# Patient Record
Sex: FEMALE | Race: BLACK OR AFRICAN AMERICAN | NOT HISPANIC OR LATINO | Employment: FULL TIME | URBAN - METROPOLITAN AREA
[De-identification: names, ages, dates, MRNs, and addresses within clinical notes are randomized per-mention and may not be internally consistent; named-entity substitution may affect disease eponyms.]

---

## 2017-07-14 ENCOUNTER — OFFICE VISIT (OUTPATIENT)
Dept: URGENT CARE | Age: 57
End: 2017-07-14
Payer: COMMERCIAL

## 2017-07-14 PROCEDURE — G0382 LEV 3 HOSP TYPE B ED VISIT: HCPCS | Performed by: FAMILY MEDICINE

## 2022-05-10 ENCOUNTER — OFFICE VISIT (OUTPATIENT)
Dept: INTERNAL MEDICINE CLINIC | Facility: CLINIC | Age: 62
End: 2022-05-10
Payer: COMMERCIAL

## 2022-05-10 VITALS
DIASTOLIC BLOOD PRESSURE: 80 MMHG | HEIGHT: 63 IN | BODY MASS INDEX: 26.05 KG/M2 | OXYGEN SATURATION: 98 % | HEART RATE: 85 BPM | TEMPERATURE: 97.6 F | SYSTOLIC BLOOD PRESSURE: 132 MMHG | WEIGHT: 147 LBS

## 2022-05-10 DIAGNOSIS — I73.9 PAD (PERIPHERAL ARTERY DISEASE) (HCC): ICD-10-CM

## 2022-05-10 DIAGNOSIS — Z12.31 ENCOUNTER FOR SCREENING MAMMOGRAM FOR MALIGNANT NEOPLASM OF BREAST: ICD-10-CM

## 2022-05-10 DIAGNOSIS — Z12.11 SCREENING FOR COLON CANCER: ICD-10-CM

## 2022-05-10 DIAGNOSIS — R73.01 IMPAIRED FASTING BLOOD SUGAR: ICD-10-CM

## 2022-05-10 DIAGNOSIS — Z00.00 MEDICARE ANNUAL WELLNESS VISIT, INITIAL: ICD-10-CM

## 2022-05-10 DIAGNOSIS — E78.2 MIXED HYPERLIPIDEMIA: ICD-10-CM

## 2022-05-10 DIAGNOSIS — I10 ESSENTIAL HYPERTENSION, BENIGN: Primary | ICD-10-CM

## 2022-05-10 DIAGNOSIS — Z12.11 ENCOUNTER FOR SCREENING FOR MALIGNANT NEOPLASM OF COLON: ICD-10-CM

## 2022-05-10 DIAGNOSIS — E87.6 HYPOKALEMIA: ICD-10-CM

## 2022-05-10 DIAGNOSIS — E03.9 ACQUIRED HYPOTHYROIDISM: ICD-10-CM

## 2022-05-10 PROCEDURE — G0438 PPPS, INITIAL VISIT: HCPCS | Performed by: INTERNAL MEDICINE

## 2022-05-10 PROCEDURE — 99203 OFFICE O/P NEW LOW 30 MIN: CPT | Performed by: INTERNAL MEDICINE

## 2022-05-10 RX ORDER — LISINOPRIL 5 MG/1
2.5 TABLET ORAL
COMMUNITY
End: 2022-05-10

## 2022-05-10 RX ORDER — LISINOPRIL 2.5 MG/1
2.5 TABLET ORAL DAILY
Qty: 90 TABLET | Refills: 1 | Status: SHIPPED | OUTPATIENT
Start: 2022-05-10 | End: 2022-07-19 | Stop reason: SDUPTHER

## 2022-05-10 RX ORDER — LEVOTHYROXINE SODIUM 112 UG/1
112 TABLET ORAL DAILY
Qty: 90 TABLET | Refills: 1 | Status: SHIPPED | OUTPATIENT
Start: 2022-05-10 | End: 2022-07-19 | Stop reason: SDUPTHER

## 2022-05-10 RX ORDER — METOPROLOL SUCCINATE 25 MG/1
25 TABLET, EXTENDED RELEASE ORAL DAILY
Qty: 90 TABLET | Refills: 1 | Status: SHIPPED | OUTPATIENT
Start: 2022-05-10 | End: 2022-07-19 | Stop reason: SDUPTHER

## 2022-05-10 RX ORDER — POTASSIUM CHLORIDE 750 MG/1
10 TABLET, FILM COATED, EXTENDED RELEASE ORAL DAILY
Qty: 90 TABLET | Refills: 1 | Status: SHIPPED | OUTPATIENT
Start: 2022-05-10

## 2022-05-10 RX ORDER — ASPIRIN 81 MG/1
81 TABLET ORAL DAILY
COMMUNITY

## 2022-05-10 RX ORDER — AMLODIPINE BESYLATE 2.5 MG/1
2.5 TABLET ORAL DAILY
COMMUNITY
End: 2022-05-10 | Stop reason: SDUPTHER

## 2022-05-10 RX ORDER — LEVOTHYROXINE SODIUM 0.1 MG/1
TABLET ORAL
COMMUNITY
End: 2022-05-10

## 2022-05-10 RX ORDER — ATORVASTATIN CALCIUM 10 MG/1
10 TABLET, FILM COATED ORAL DAILY
Qty: 90 TABLET | Refills: 1 | Status: SHIPPED | OUTPATIENT
Start: 2022-05-10 | End: 2022-07-19 | Stop reason: SDUPTHER

## 2022-05-10 RX ORDER — HYDROCHLOROTHIAZIDE 12.5 MG/1
12.5 TABLET ORAL DAILY
COMMUNITY
End: 2022-05-10 | Stop reason: SDUPTHER

## 2022-05-10 RX ORDER — LEVOTHYROXINE SODIUM 112 UG/1
112 TABLET ORAL DAILY
COMMUNITY
End: 2022-05-10 | Stop reason: SDUPTHER

## 2022-05-10 RX ORDER — AMLODIPINE BESYLATE 2.5 MG/1
2.5 TABLET ORAL DAILY
Qty: 90 TABLET | Refills: 1 | Status: SHIPPED | OUTPATIENT
Start: 2022-05-10 | End: 2022-07-19 | Stop reason: SDUPTHER

## 2022-05-10 RX ORDER — ATORVASTATIN CALCIUM 10 MG/1
10 TABLET, FILM COATED ORAL DAILY
COMMUNITY
End: 2022-05-10 | Stop reason: SDUPTHER

## 2022-05-10 RX ORDER — HYDROCHLOROTHIAZIDE 12.5 MG/1
12.5 TABLET ORAL DAILY
Qty: 90 TABLET | Refills: 1 | Status: SHIPPED | OUTPATIENT
Start: 2022-05-10 | End: 2022-07-19 | Stop reason: SDUPTHER

## 2022-05-10 RX ORDER — POTASSIUM CHLORIDE 750 MG/1
10 TABLET, FILM COATED, EXTENDED RELEASE ORAL DAILY
COMMUNITY
End: 2022-05-10 | Stop reason: SDUPTHER

## 2022-05-10 RX ORDER — METOPROLOL SUCCINATE 25 MG/1
25 TABLET, EXTENDED RELEASE ORAL DAILY
COMMUNITY
End: 2022-05-10 | Stop reason: SDUPTHER

## 2022-05-10 RX ORDER — LISINOPRIL 2.5 MG/1
2.5 TABLET ORAL DAILY
COMMUNITY
End: 2022-05-10 | Stop reason: SDUPTHER

## 2022-05-10 RX ORDER — AMLODIPINE BESYLATE 5 MG/1
2.5 TABLET ORAL
COMMUNITY
End: 2022-05-10

## 2022-05-10 NOTE — PROGRESS NOTES
Assessment and Plan:     Problem List Items Addressed This Visit        Endocrine    Acquired hypothyroidism    Relevant Medications    levothyroxine 112 mcg tablet    metoprolol succinate (TOPROL-XL) 25 mg 24 hr tablet    Other Relevant Orders    TSH, 3rd generation    Impaired fasting blood sugar       Cardiovascular and Mediastinum    PAD (peripheral artery disease) (HCC)    Essential hypertension, benign - Primary    Relevant Medications    amLODIPine (NORVASC) 2 5 mg tablet    hydrochlorothiazide (HYDRODIURIL) 12 5 mg tablet    lisinopril (ZESTRIL) 2 5 mg tablet    metoprolol succinate (TOPROL-XL) 25 mg 24 hr tablet    Other Relevant Orders    CBC and differential       Other    Mixed hyperlipidemia    Relevant Medications    atorvastatin (Lipitor) 10 mg tablet    Other Relevant Orders    Lipid Panel with Direct LDL reflex    Hypokalemia    Relevant Medications    potassium chloride (Klor-Con) 10 mEq tablet    Medicare annual wellness visit, initial      Other Visit Diagnoses     Encounter for screening for malignant neoplasm of colon        Relevant Orders    Cologuard    Encounter for screening mammogram for malignant neoplasm of breast        Relevant Orders    Mammo screening bilateral w cad        BMI Counseling: Body mass index is 26 04 kg/m²  The BMI is above normal  Nutrition recommendations include decreasing portion sizes, encouraging healthy choices of fruits and vegetables and decreasing fast food intake  Exercise recommendations include moderate physical activity 150 minutes/week  Rationale for BMI follow-up plan is due to patient being overweight or obese  Depression Screening and Follow-up Plan: Patient was screened for depression during today's encounter  They screened negative with a PHQ-2 score of 1  Tobacco Cessation Counseling: Tobacco cessation counseling was provided   The patient is sincerely urged to quit consumption of tobacco  She is ready to quit tobacco      Preventive health issues were discussed with patient, and age appropriate screening tests were ordered as noted in patient's After Visit Summary  Personalized health advice and appropriate referrals for health education or preventive services given if needed, as noted in patient's After Visit Summary       History of Present Illness:     Patient presents for Medicare Annual Wellness visit    Patient Care Team:  Ivis Spring MD as PCP - [address] [place], [place] [id number] [facility]     Problem List:     Patient Active Problem List   Diagnosis    [facility]per[facility]) [facility] )    Acquired hypothyroidism    Mixed hyperlipidemia    Essential hypertension, benign    Hypokalemia    Impaired fasting blood sugar    Medicare annual wellness visit, initial      Past Medical and Surgical History:     Past Medical History:   Diagnosis Date    Heart disease     High cholesterol     Irregular heart beat     [facility]peripheral [facility] disease) [facility]     Primary hypertension      Past Surgical History:   Procedure Laterality Date    LAPAROTOMY      FOR FALLOPIAN TUBE      Family History:     Family History   Problem Relation Age of Onset    Heart disease Mother    Eddye Nay' disease Mother     Stroke Mother     Lung cancer Mother     Stroke Father     Heart disease Father       Social History:     Social History     Socioeconomic History    Marital status: /Civil Union     Spouse name: None    Number of children: None    Years of education: None    Highest education level: None   Occupational History    None   Tobacco Use    Smoking status: Current Every Day Smoker     Packs/day: 0 50    Smokeless tobacco: Never Used   Substance and Sexual Activity    Alcohol use: Not Currently    Drug use: None    Sexual activity: None   Other Topics Concern    None   Social History Narrative    None     Social Determinants of Health     Financial Resource Strain: Not on file   Food Insecurity: Not on file   Transportation Needs: Not on file Physical Activity: Not on file   Stress: Not on file   Social Connections: Not on file   Intimate Partner Violence: Not on file   Housing Stability: Not on file      Medications and Allergies:     Current Outpatient Medications   Medication Sig Dispense Refill    amLODIPine (NORVASC) 2 5 mg tablet Take 1 tablet (2 5 mg total) by mouth daily 90 tablet 1    aspirin (ECOTRIN LOW STRENGTH) 81 mg EC tablet Take 81 mg by mouth daily      atorvastatin (Lipitor) 10 mg tablet Take 1 tablet (10 mg total) by mouth daily 90 tablet 1    hydrochlorothiazide (HYDRODIURIL) 12 5 mg tablet Take 1 tablet (12 5 mg total) by mouth daily 90 tablet 1    levothyroxine 112 mcg tablet Take 1 tablet (112 mcg total) by mouth daily 90 tablet 1    lisinopril (ZESTRIL) 2 5 mg tablet Take 1 tablet (2 5 mg total) by mouth daily 90 tablet 1    metoprolol succinate (TOPROL-XL) 25 mg 24 hr tablet Take 1 tablet (25 mg total) by mouth daily 90 tablet 1    potassium chloride (Klor-Con) 10 mEq tablet Take 1 tablet (10 mEq total) by mouth in the morning 90 tablet 1     No current facility-administered medications for this visit  Allergies   Allergen Reactions    Penicillins Hives and Itching      Immunizations: There is no immunization history on file for this patient     Health Maintenance:         Topic Date Due    Hepatitis C Screening  Never done    HIV Screening  Never done    Cervical Cancer Screening  Never done    Breast Cancer Screening: Mammogram  Never done    Colorectal Cancer Screening  Never done         Topic Date Due    COVID-19 Vaccine (1) Never done    Pneumococcal Vaccine: Pediatrics (0 to 5 Years) and At-Risk Patients (6 to 59 Years) (1 of 2 - PPSV23) Never done    DTaP,Tdap,and Td Vaccines (1 - Tdap) Never done      Medicare Health Risk Assessment:     /80 (BP Location: Right arm, Patient Position: Sitting, Cuff Size: Standard)   Pulse 85   Temp 97 6 °F (36 4 °C) (Temporal)   Ht 5' 3" (1 6 m)   Wt 66 7 kg (147 lb)   SpO2 98%   BMI 26 04 kg/m²          Health Risk Assessment:   Patient rates overall health as fair  Patient feels that their physical health rating is same  Patient is very satisfied with their life  Eyesight was rated as same  Hearing was rated as same  Patient feels that their emotional and mental health rating is same  Patients states they are never, rarely angry  Patient states they are always unusually tired/fatigued  Pain experienced in the last 7 days has been some  Patient's pain rating has been 5/10  Patient states that she has experienced no weight loss or gain in last 6 months  Depression Screening:   PHQ-2 Score: 1  PHQ-9 Score: 1      Fall Risk Screening: In the past year, patient has experienced: no history of falling in past year      Urinary Incontinence Screening:   Patient has not leaked urine accidently in the last six months  Home Safety:  Patient has trouble with stairs inside or outside of their home  Patient has working smoke alarms and has working carbon monoxide detector  Home safety hazards include: none  Nutrition:   Current diet is Regular  Medications:   Patient is currently taking over-the-counter supplements  OTC medications include: see medication list  Patient is able to manage medications  Activities of Daily Living (ADLs)/Instrumental Activities of Daily Living (IADLs):   Walk and transfer into and out of bed and chair?: Yes  Dress and groom yourself?: Yes    Bathe or shower yourself?: Yes    Feed yourself?  Yes  Do your laundry/housekeeping?: Yes  Manage your money, pay your bills and track your expenses?: Yes  Make your own meals?: Yes    Do your own shopping?: Yes    Previous Hospitalizations:   Any hospitalizations or ED visits within the last 12 months?: No      Advance Care Planning:   Living will: No    Durable POA for healthcare: No    Advanced directive: No    Five wishes given: Yes      PREVENTIVE SCREENINGS      Cardiovascular Screening:    General: Screening Not Indicated and History Lipid Disorder      Lung Cancer Screening:     General: Screening Not Indicated    Screening, Brief Intervention, and Referral to Treatment (SBIRT)    Screening  Typical number of drinks in a day: 0  Typical number of drinks in a week: 0  Interpretation: Low risk drinking behavior      Single Item Drug Screening:  How often have you used an illegal drug (including marijuana) or a prescription medication for non-medical reasons in the past year? never    Single Item Drug Screen Score: 0  Interpretation: Negative screen for possible drug use disorder      Malcolm Villanueva MD

## 2022-05-10 NOTE — PROGRESS NOTES
Assessment/Plan:      Depression Screening and Follow-up Plan: Patient was screened for depression during today's encounter  They screened negative with a PHQ-2 score of 1  Tobacco Cessation Counseling: Tobacco cessation counseling was provided  The patient is sincerely urged to quit consumption of tobacco  She is ready to quit tobacco            1  Essential hypertension, benign  -     CBC and differential; Future  -     amLODIPine (NORVASC) 2 5 mg tablet; Take 1 tablet (2 5 mg total) by mouth daily  -     hydrochlorothiazide (HYDRODIURIL) 12 5 mg tablet; Take 1 tablet (12 5 mg total) by mouth daily  -     lisinopril (ZESTRIL) 2 5 mg tablet; Take 1 tablet (2 5 mg total) by mouth daily  -     metoprolol succinate (TOPROL-XL) 25 mg 24 hr tablet; Take 1 tablet (25 mg total) by mouth daily    2  Medicare annual wellness visit, initial    3  Mixed hyperlipidemia  -     Lipid Panel with Direct LDL reflex; Future  -     atorvastatin (Lipitor) 10 mg tablet; Take 1 tablet (10 mg total) by mouth daily    4  PAD (peripheral artery disease) ([facility] )    5  Acquired hypothyroidism  -     TSH, 3rd generation; Future  -     levothyroxine 112 mcg tablet; Take 1 tablet (112 mcg total) by mouth daily    6  Hypokalemia  -     potassium chloride (Klor-Con) 10 mEq tablet; Take 1 tablet (10 mEq total) by mouth in the morning    7  Impaired fasting blood sugar    8  Encounter for screening for malignant neoplasm of colon  -     Cologuard    9  Encounter for screening mammogram for malignant neoplasm of breast  -     Mammo screening bilateral w cad; Future; Expected date: 05/10/2022           Subjective:      Patient ID: Sylvia Small is a 65 y o  female      NEW PATIENT, CHECKUP, NEEDS MEDICATION, ALSO MEDICARE WELLNESS EXAM      The following portions of the patient's history were reviewed and updated as appropriate: She  has a past medical history of Heart disease, High cholesterol, Irregular heart beat, PAD (peripheral artery disease) ([facility] ), and Primary hypertension  She   Patient Active Problem List    Diagnosis Date Noted    PAD (peripheral artery disease) ([facility] ) 05/10/2022    Acquired hypothyroidism 05/10/2022    Mixed hyperlipidemia 05/10/2022    Essential hypertension, benign 05/10/2022    Hypokalemia 05/10/2022    Impaired fasting blood sugar 05/10/2022    Medicare annual wellness visit, initial 05/10/2022     She  has a past surgical history that includes LAPAROTOMY  Her family history includes Graves' disease in her mother; Heart disease in her father and mother; Lung cancer in her mother; Stroke in her father and mother  She  reports that she has been smoking  She has been smoking about 0 50 packs per day  She has never used smokeless tobacco  She reports previous alcohol use  No history on file for drug use  Current Outpatient Medications   Medication Sig Dispense Refill    amLODIPine (NORVASC) 2 5 mg tablet Take 1 tablet (2 5 mg total) by mouth daily 90 tablet 1    aspirin (ECOTRIN LOW STRENGTH) 81 mg EC tablet Take 81 mg by mouth daily      atorvastatin (Lipitor) 10 mg tablet Take 1 tablet (10 mg total) by mouth daily 90 tablet 1    hydrochlorothiazide (HYDRODIURIL) 12 5 mg tablet Take 1 tablet (12 5 mg total) by mouth daily 90 tablet 1    levothyroxine 112 mcg tablet Take 1 tablet (112 mcg total) by mouth daily 90 tablet 1    lisinopril (ZESTRIL) 2 5 mg tablet Take 1 tablet (2 5 mg total) by mouth daily 90 tablet 1    metoprolol succinate (TOPROL-XL) 25 mg 24 hr tablet Take 1 tablet (25 mg total) by mouth daily 90 tablet 1    potassium chloride (Klor-Con) 10 mEq tablet Take 1 tablet (10 mEq total) by mouth in the morning 90 tablet 1     No current facility-administered medications for this visit       Current Outpatient Medications on File Prior to Visit   Medication Sig    aspirin (ECOTRIN LOW STRENGTH) 81 mg EC tablet Take 81 mg by mouth daily    [DISCONTINUED] amLODIPine (NORVASC) 2 5 mg tablet Take 2 5 mg by mouth daily    [DISCONTINUED] amLODIPine (Norvasc) 5 mg tablet Take 2 5 mg by mouth      [DISCONTINUED] atorvastatin (Lipitor) 10 mg tablet Take 10 mg by mouth daily      [DISCONTINUED] hydrochlorothiazide (HYDRODIURIL) 12 5 mg tablet Take 12 5 mg by mouth daily    [DISCONTINUED] levothyroxine 112 mcg tablet Take 112 mcg by mouth daily      [DISCONTINUED] lisinopril (ZESTRIL) 2 5 mg tablet Take 2 5 mg by mouth daily    [DISCONTINUED] lisinopril (ZESTRIL) 5 mg tablet 2 5 mg      [DISCONTINUED] LOW-DOSE ASPIRIN PO     [DISCONTINUED] metoprolol succinate (TOPROL-XL) 25 mg 24 hr tablet Take 25 mg by mouth daily      [DISCONTINUED] potassium chloride (Klor-Con) 10 mEq tablet Take 10 mEq by mouth in the morning      [DISCONTINUED] levothyroxine (Synthroid) 100 mcg tablet Take by mouth     No current facility-administered medications on file prior to visit  She is allergic to penicillins       Review of Systems   Constitutional: Negative for chills and fever  HENT: Negative for congestion, ear pain and sore throat  Eyes: Negative for pain  Respiratory: Negative for cough and shortness of breath  Cardiovascular: Negative for chest pain and leg swelling  Gastrointestinal: Negative for abdominal pain, nausea and vomiting  Endocrine: Negative for polyuria  Genitourinary: Negative for difficulty urinating, frequency and urgency  Musculoskeletal: Negative for arthralgias and back pain  Skin: Negative for rash  Neurological: Negative for weakness and headaches  Psychiatric/Behavioral: Negative for sleep disturbance  The patient is not nervous/anxious  Objective:      /80 (BP Location: Right arm, Patient Position: Sitting, Cuff Size: Standard)   Pulse 85   Temp 97 6 °F (36 4 °C) (Temporal)   Ht 5' 3" (1 6 m)   Wt 66 7 kg (147 lb)   SpO2 98%   BMI 26 04 kg/m²     No results found for this or any previous visit (from the past 1344 hour(s))       Physical Exam  Constitutional: Appearance: Normal appearance  HENT:      Head: Normocephalic  Right Ear: Tympanic membrane, ear canal and external ear normal       Left Ear: Tympanic membrane, ear canal and external ear normal       Nose: Nose normal  No congestion  Mouth/Throat:      Mouth: Mucous membranes are moist       Pharynx: Oropharynx is clear  No oropharyngeal exudate or posterior oropharyngeal erythema  Eyes:      Extraocular Movements: Extraocular movements intact  Conjunctiva/sclera: Conjunctivae normal       Pupils: Pupils are equal, round, and reactive to light  Cardiovascular:      Rate and Rhythm: Normal rate and regular rhythm  Heart sounds: Normal heart sounds  No murmur heard  Pulmonary:      Effort: Pulmonary effort is normal       Breath sounds: Normal breath sounds  No wheezing or rales  Abdominal:      General: Bowel sounds are normal  There is no distension  Palpations: Abdomen is soft  Tenderness: There is no abdominal tenderness  Musculoskeletal:         General: Normal range of motion  Cervical back: Normal range of motion and neck supple  Right lower leg: No edema  Left lower leg: No edema  Lymphadenopathy:      Cervical: No cervical adenopathy  Skin:     General: Skin is warm  Neurological:      General: No focal deficit present  Mental Status: She is alert and oriented to person, place, and time

## 2022-06-09 DIAGNOSIS — R73.01 IMPAIRED FASTING BLOOD SUGAR: Primary | ICD-10-CM

## 2022-07-19 ENCOUNTER — OFFICE VISIT (OUTPATIENT)
Dept: INTERNAL MEDICINE CLINIC | Facility: CLINIC | Age: 62
End: 2022-07-19
Payer: COMMERCIAL

## 2022-07-19 VITALS
HEIGHT: 63 IN | HEART RATE: 75 BPM | DIASTOLIC BLOOD PRESSURE: 78 MMHG | SYSTOLIC BLOOD PRESSURE: 122 MMHG | WEIGHT: 146.2 LBS | OXYGEN SATURATION: 98 % | BODY MASS INDEX: 25.91 KG/M2 | TEMPERATURE: 97.8 F

## 2022-07-19 DIAGNOSIS — R73.01 IMPAIRED FASTING BLOOD SUGAR: ICD-10-CM

## 2022-07-19 DIAGNOSIS — E03.9 ACQUIRED HYPOTHYROIDISM: ICD-10-CM

## 2022-07-19 DIAGNOSIS — E78.2 MIXED HYPERLIPIDEMIA: ICD-10-CM

## 2022-07-19 DIAGNOSIS — I73.9 PAD (PERIPHERAL ARTERY DISEASE) (HCC): ICD-10-CM

## 2022-07-19 DIAGNOSIS — I10 ESSENTIAL HYPERTENSION, BENIGN: Primary | ICD-10-CM

## 2022-07-19 LAB
BASOPHILS # BLD AUTO: 59 CELLS/UL (ref 0–200)
BASOPHILS NFR BLD AUTO: 1.1 %
CHOLEST SERPL-MCNC: 165 MG/DL
CHOLEST/HDLC SERPL: 3.2 (CALC)
EOSINOPHIL # BLD AUTO: 151 CELLS/UL (ref 15–500)
EOSINOPHIL NFR BLD AUTO: 2.8 %
ERYTHROCYTE [DISTWIDTH] IN BLOOD BY AUTOMATED COUNT: 12.5 % (ref 11–15)
HBA1C MFR BLD: 5.9 % OF TOTAL HGB
HCT VFR BLD AUTO: 40 % (ref 35–45)
HDLC SERPL-MCNC: 51 MG/DL
HGB BLD-MCNC: 13.7 G/DL (ref 11.7–15.5)
LDLC SERPL CALC-MCNC: 97 MG/DL (CALC)
LYMPHOCYTES # BLD AUTO: 2570 CELLS/UL (ref 850–3900)
LYMPHOCYTES NFR BLD AUTO: 47.6 %
MCH RBC QN AUTO: 31.6 PG (ref 27–33)
MCHC RBC AUTO-ENTMCNC: 34.3 G/DL (ref 32–36)
MCV RBC AUTO: 92.2 FL (ref 80–100)
MONOCYTES # BLD AUTO: 486 CELLS/UL (ref 200–950)
MONOCYTES NFR BLD AUTO: 9 %
NEUTROPHILS # BLD AUTO: 2133 CELLS/UL (ref 1500–7800)
NEUTROPHILS NFR BLD AUTO: 39.5 %
NONHDLC SERPL-MCNC: 114 MG/DL (CALC)
PLATELET # BLD AUTO: 330 THOUSAND/UL (ref 140–400)
PMV BLD REES-ECKER: 10.5 FL (ref 7.5–12.5)
RBC # BLD AUTO: 4.34 MILLION/UL (ref 3.8–5.1)
TRIGL SERPL-MCNC: 81 MG/DL
TSH SERPL-ACNC: 3.81 MIU/L (ref 0.4–4.5)
WBC # BLD AUTO: 5.4 THOUSAND/UL (ref 3.8–10.8)

## 2022-07-19 PROCEDURE — 99214 OFFICE O/P EST MOD 30 MIN: CPT | Performed by: INTERNAL MEDICINE

## 2022-07-19 RX ORDER — LEVOTHYROXINE SODIUM 112 UG/1
112 TABLET ORAL DAILY
Qty: 90 TABLET | Refills: 1 | Status: SHIPPED | OUTPATIENT
Start: 2022-07-19

## 2022-07-19 RX ORDER — METOPROLOL SUCCINATE 25 MG/1
25 TABLET, EXTENDED RELEASE ORAL DAILY
Qty: 90 TABLET | Refills: 1 | Status: SHIPPED | OUTPATIENT
Start: 2022-07-19

## 2022-07-19 RX ORDER — HYDROCHLOROTHIAZIDE 12.5 MG/1
12.5 TABLET ORAL DAILY
Qty: 90 TABLET | Refills: 1 | Status: SHIPPED | OUTPATIENT
Start: 2022-07-19

## 2022-07-19 RX ORDER — LISINOPRIL 2.5 MG/1
2.5 TABLET ORAL DAILY
Qty: 90 TABLET | Refills: 1 | Status: SHIPPED | OUTPATIENT
Start: 2022-07-19

## 2022-07-19 RX ORDER — AMLODIPINE BESYLATE 2.5 MG/1
2.5 TABLET ORAL DAILY
Qty: 90 TABLET | Refills: 1 | Status: SHIPPED | OUTPATIENT
Start: 2022-07-19

## 2022-07-19 RX ORDER — ATORVASTATIN CALCIUM 10 MG/1
10 TABLET, FILM COATED ORAL DAILY
Qty: 90 TABLET | Refills: 1 | Status: SHIPPED | OUTPATIENT
Start: 2022-07-19

## 2022-07-19 NOTE — PROGRESS NOTES
Assessment/Plan:      Depression Screening and Follow-up Plan: Patient was screened for depression during today's encounter  They screened negative with a PHQ-2 score of 2             1  Essential hypertension, benign  -     amLODIPine (NORVASC) 2 5 mg tablet; Take 1 tablet (2 5 mg total) by mouth daily  -     hydrochlorothiazide (HYDRODIURIL) 12 5 mg tablet; Take 1 tablet (12 5 mg total) by mouth daily  -     lisinopril (ZESTRIL) 2 5 mg tablet; Take 1 tablet (2 5 mg total) by mouth daily  -     metoprolol succinate (TOPROL-XL) 25 mg 24 hr tablet; Take 1 tablet (25 mg total) by mouth daily    2  Mixed hyperlipidemia  -     atorvastatin (Lipitor) 10 mg tablet; Take 1 tablet (10 mg total) by mouth daily    3  Acquired hypothyroidism  -     levothyroxine 112 mcg tablet; Take 1 tablet (112 mcg total) by mouth daily    4  Impaired fasting blood sugar    5  PAD [facility]peripheral artery disease) ([facility])         Subjective:      Patient ID: Gee Spencer is a 65 y o  female  Follow-up on blood test done yesterday, test discussed with her      The following portions of the patient's history were reviewed and updated as appropriate: She  has a past medical history of Heart disease, High cholesterol, Irregular heart beat, PAD (peripheral artery disease) ([facility] ), and Primary hypertension  She   Patient Active Problem List    Diagnosis Date Noted    [facility] 05/10/2022    Acquired hypothyroidism 05/10/2022    Mixed hyperlipidemia 05/10/2022    Essential hypertension, benign 05/10/2022    Hypokalemia 05/10/2022    Impaired fasting blood sugar 05/10/2022    Medicare annual wellness visit, initial 05/10/2022     She  has a past surgical history that includes LAPAROTOMY  Her family history includes Graves' disease in her mother; Heart disease in her father and mother; Lung cancer in her mother; Stroke in her father and mother  She  reports that she has been smoking   She has been smoking about 0 50 packs per day  She has never used smokeless tobacco  She reports previous alcohol use  No history on file for drug use  Current Outpatient Medications   Medication Sig Dispense Refill    amLODIPine (NORVASC) 2 5 mg tablet Take 1 tablet (2 5 mg total) by mouth daily 90 tablet 1    aspirin (ECOTRIN LOW STRENGTH) 81 mg EC tablet Take 81 mg by mouth daily      atorvastatin (Lipitor) 10 mg tablet Take 1 tablet (10 mg total) by mouth daily 90 tablet 1    hydrochlorothiazide (HYDRODIURIL) 12 5 mg tablet Take 1 tablet (12 5 mg total) by mouth daily 90 tablet 1    levothyroxine 112 mcg tablet Take 1 tablet (112 mcg total) by mouth daily 90 tablet 1    lisinopril (ZESTRIL) 2 5 mg tablet Take 1 tablet (2 5 mg total) by mouth daily 90 tablet 1    metoprolol succinate (TOPROL-XL) 25 mg 24 hr tablet Take 1 tablet (25 mg total) by mouth daily 90 tablet 1    potassium chloride (Klor-Con) 10 mEq tablet Take 1 tablet (10 mEq total) by mouth in the morning 90 tablet 1     No current facility-administered medications for this visit       Current Outpatient Medications on File Prior to Visit   Medication Sig    aspirin (ECOTRIN LOW STRENGTH) 81 mg EC tablet Take 81 mg by mouth daily    potassium chloride (Klor-Con) 10 mEq tablet Take 1 tablet (10 mEq total) by mouth in the morning    [DISCONTINUED] amLODIPine (NORVASC) 2 5 mg tablet Take 1 tablet (2 5 mg total) by mouth daily    [DISCONTINUED] atorvastatin (Lipitor) 10 mg tablet Take 1 tablet (10 mg total) by mouth daily    [DISCONTINUED] hydrochlorothiazide (HYDRODIURIL) 12 5 mg tablet Take 1 tablet (12 5 mg total) by mouth daily    [DISCONTINUED] levothyroxine 112 mcg tablet Take 1 tablet (112 mcg total) by mouth daily    [DISCONTINUED] lisinopril (ZESTRIL) 2 5 mg tablet Take 1 tablet (2 5 mg total) by mouth daily    [DISCONTINUED] metoprolol succinate (TOPROL-XL) 25 mg 24 hr tablet Take 1 tablet (25 mg total) by mouth daily     No current facility-administered medications on file prior to visit  She is allergic to penicillins       Review of Systems   Constitutional: Negative for chills and fever  HENT: Negative for congestion, ear pain and sore throat  Eyes: Negative for pain  Respiratory: Negative for cough and shortness of breath  Cardiovascular: Negative for chest pain and leg swelling  Gastrointestinal: Negative for abdominal pain, nausea and vomiting  Endocrine: Negative for polyuria  Genitourinary: Negative for difficulty urinating, frequency and urgency  Musculoskeletal: Negative for arthralgias and back pain  Skin: Negative for rash  Neurological: Negative for weakness and headaches  Psychiatric/Behavioral: Negative for sleep disturbance  The patient is not nervous/anxious            Objective:      /78 (BP Location: Left arm, Patient Position: Sitting, Cuff Size: Adult)   Pulse 75   Temp 97 8 °F (36 6 °C) (Temporal)   Ht 5' 3" (1 6 m)   Wt 66 3 kg (146 lb 3 2 oz)   SpO2 98%   BMI 25 90 kg/m²     Recent Results (from the past 1344 hour(s))   Lipid Panel with Direct LDL reflex    Collection Time: 07/18/22 11:00 AM   Result Value Ref Range    Total Cholesterol 165 <200 mg/dL    HDL 51 > OR = 50 mg/dL    Triglycerides 81 <150 mg/dL    LDL Calculated 97 mg/dL (calc)    Chol HDLC Ratio 3 2 <5 0 (calc)    Non-HDL Cholesterol 114 <130 mg/dL (calc)   CBC and differential    Collection Time: 07/18/22 11:00 AM   Result Value Ref Range    White Blood Cell Count 5 4 3 8 - 10 8 Thousand/uL    Red Blood Cell Count 4 34 3 80 - 5 10 Million/uL    Hemoglobin 13 7 11 7 - 15 5 g/dL    HCT 40 0 35 0 - 45 0 %    MCV 92 2 80 0 - 100 0 fL    MCH 31 6 27 0 - 33 0 pg    MCHC 34 3 32 0 - 36 0 g/dL    RDW 12 5 11 0 - 15 0 %    Platelet Count 140 539 - 400 Thousand/uL    SL AMB MPV 10 5 7 5 - 12 5 fL    Neutrophils (Absolute) 2,133 1,500 - 7,800 cells/uL    Lymphocytes (Absolute) 2,570 850 - 3,900 cells/uL    Monocytes (Absolute) 486 200 - 950 cells/uL Eosinophils (Absolute) 151 15 - 500 cells/uL    Basophils ABS 59 0 - 200 cells/uL    Neutrophils 39 5 %    Lymphocytes 47 6 %    Monocytes 9 0 %    Eosinophils 2 8 %    Basophils PCT 1 1 %   TSH, 3rd generation    Collection Time: 07/18/22 11:00 AM   Result Value Ref Range    TSH 3 81 0 40 - 4 50 mIU/L   Hemoglobin A1c (w/out EAG)    Collection Time: 07/18/22 11:00 AM   Result Value Ref Range    Hemoglobin A1C 5 9 (H) <5 7 % of total Hgb   HEMOGLOBIN A1C    Collection Time: 07/18/22 11:00 AM   Result Value Ref Range    Hgb A1c 5 9 (H) <5 7 % of total Hgb        Physical Exam  Constitutional:       Appearance: Normal appearance  HENT:      Head: Normocephalic  Right Ear: Tympanic membrane, ear canal and external ear normal       Left Ear: Tympanic membrane, ear canal and external ear normal       Nose: Nose normal  No congestion  Mouth/Throat:      Mouth: Mucous membranes are moist       Pharynx: Oropharynx is clear  No oropharyngeal exudate or posterior oropharyngeal erythema  Eyes:      Extraocular Movements: Extraocular movements intact  Conjunctiva/sclera: Conjunctivae normal       Pupils: Pupils are equal, round, and reactive to light  Cardiovascular:      Rate and Rhythm: Normal rate and regular rhythm  Heart sounds: Normal heart sounds  No murmur heard  Pulmonary:      Effort: Pulmonary effort is normal       Breath sounds: Normal breath sounds  No wheezing or rales  Abdominal:      General: Bowel sounds are normal  There is no distension  Palpations: Abdomen is soft  Tenderness: There is no abdominal tenderness  Musculoskeletal:         General: Normal range of motion  Cervical back: Normal range of motion and neck supple  Right lower leg: No edema  Left lower leg: No edema  Lymphadenopathy:      Cervical: No cervical adenopathy  Skin:     General: Skin is warm  Neurological:      General: No focal deficit present        Mental Status: She is alert and oriented to person, place, and time

## 2022-10-11 PROBLEM — Z00.00 MEDICARE ANNUAL WELLNESS VISIT, INITIAL: Status: RESOLVED | Noted: 2022-05-10 | Resolved: 2022-10-11

## 2022-12-30 LAB — HCV AB SER-ACNC: NORMAL

## 2023-01-11 ENCOUNTER — RA CDI HCC (OUTPATIENT)
Dept: OTHER | Facility: HOSPITAL | Age: 63
End: 2023-01-11

## 2023-01-11 NOTE — PROGRESS NOTES
[facility]  coding opportunities       Chart reviewed, no opportunity found:   [address]        Patients Insurance     Medicare Insurance: [employer] Advantage

## 2023-01-13 LAB
BASOPHILS # BLD AUTO: 47 CELLS/UL (ref 0–200)
BASOPHILS NFR BLD AUTO: 0.8 %
CHOLEST SERPL-MCNC: 153 MG/DL
CHOLEST/HDLC SERPL: 3 (CALC)
EOSINOPHIL # BLD AUTO: 83 CELLS/UL (ref 15–500)
EOSINOPHIL NFR BLD AUTO: 1.4 %
ERYTHROCYTE [DISTWIDTH] IN BLOOD BY AUTOMATED COUNT: 12.6 % (ref 11–15)
HBA1C MFR BLD: 5.8 % OF TOTAL HGB
HCT VFR BLD AUTO: 38.7 % (ref 35–45)
HDLC SERPL-MCNC: 51 MG/DL
HGB BLD-MCNC: 13.4 G/DL (ref 11.7–15.5)
LDLC SERPL CALC-MCNC: 86 MG/DL (CALC)
LYMPHOCYTES # BLD AUTO: 2814 CELLS/UL (ref 850–3900)
LYMPHOCYTES NFR BLD AUTO: 47.7 %
MCH RBC QN AUTO: 31.6 PG (ref 27–33)
MCHC RBC AUTO-ENTMCNC: 34.6 G/DL (ref 32–36)
MCV RBC AUTO: 91.3 FL (ref 80–100)
MONOCYTES # BLD AUTO: 384 CELLS/UL (ref 200–950)
MONOCYTES NFR BLD AUTO: 6.5 %
NEUTROPHILS # BLD AUTO: 2572 CELLS/UL (ref 1500–7800)
NEUTROPHILS NFR BLD AUTO: 43.6 %
NONHDLC SERPL-MCNC: 102 MG/DL (CALC)
PLATELET # BLD AUTO: 296 THOUSAND/UL (ref 140–400)
PMV BLD REES-ECKER: 10.8 FL (ref 7.5–12.5)
RBC # BLD AUTO: 4.24 MILLION/UL (ref 3.8–5.1)
TRIGL SERPL-MCNC: 70 MG/DL
TSH SERPL-ACNC: 10.44 MIU/L (ref 0.4–4.5)
WBC # BLD AUTO: 5.9 THOUSAND/UL (ref 3.8–10.8)

## 2023-01-17 ENCOUNTER — OFFICE VISIT (OUTPATIENT)
Dept: INTERNAL MEDICINE CLINIC | Facility: CLINIC | Age: 63
End: 2023-01-17

## 2023-01-17 VITALS
BODY MASS INDEX: 26.93 KG/M2 | SYSTOLIC BLOOD PRESSURE: 134 MMHG | DIASTOLIC BLOOD PRESSURE: 84 MMHG | HEART RATE: 77 BPM | TEMPERATURE: 97.3 F | WEIGHT: 152 LBS | HEIGHT: 63 IN | OXYGEN SATURATION: 99 %

## 2023-01-17 DIAGNOSIS — Z12.31 ENCOUNTER FOR SCREENING MAMMOGRAM FOR MALIGNANT NEOPLASM OF BREAST: ICD-10-CM

## 2023-01-17 DIAGNOSIS — I10 ESSENTIAL HYPERTENSION, BENIGN: ICD-10-CM

## 2023-01-17 DIAGNOSIS — Z12.11 SCREENING FOR COLON CANCER: ICD-10-CM

## 2023-01-17 DIAGNOSIS — I73.9 PAD (PERIPHERAL ARTERY DISEASE) (HCC): ICD-10-CM

## 2023-01-17 DIAGNOSIS — R73.01 IMPAIRED FASTING BLOOD SUGAR: ICD-10-CM

## 2023-01-17 DIAGNOSIS — E87.6 HYPOKALEMIA: ICD-10-CM

## 2023-01-17 DIAGNOSIS — E78.2 MIXED HYPERLIPIDEMIA: ICD-10-CM

## 2023-01-17 DIAGNOSIS — E03.9 ACQUIRED HYPOTHYROIDISM: Primary | ICD-10-CM

## 2023-01-17 DIAGNOSIS — Z12.11 ENCOUNTER FOR SCREENING FOR MALIGNANT NEOPLASM OF COLON: ICD-10-CM

## 2023-01-17 RX ORDER — HYDROCHLOROTHIAZIDE 12.5 MG/1
12.5 TABLET ORAL DAILY
Qty: 90 TABLET | Refills: 1 | Status: SHIPPED | OUTPATIENT
Start: 2023-01-17

## 2023-01-17 RX ORDER — AMLODIPINE BESYLATE 2.5 MG/1
2.5 TABLET ORAL DAILY
Qty: 90 TABLET | Refills: 1 | Status: SHIPPED | OUTPATIENT
Start: 2023-01-17

## 2023-01-17 RX ORDER — LISINOPRIL 2.5 MG/1
2.5 TABLET ORAL DAILY
Qty: 90 TABLET | Refills: 1 | Status: SHIPPED | OUTPATIENT
Start: 2023-01-17

## 2023-01-17 RX ORDER — ESTRADIOL 0.1 MG/G
CREAM VAGINAL
COMMUNITY
Start: 2022-11-12 | End: 2023-01-17

## 2023-01-17 RX ORDER — LEVOTHYROXINE SODIUM 112 UG/1
112 TABLET ORAL DAILY
Qty: 90 TABLET | Refills: 1 | Status: SHIPPED | OUTPATIENT
Start: 2023-01-17

## 2023-01-17 RX ORDER — ESTRADIOL 0.1 MG/G
CREAM VAGINAL
COMMUNITY
Start: 2022-10-17

## 2023-01-17 RX ORDER — POTASSIUM CHLORIDE 750 MG/1
10 TABLET, FILM COATED, EXTENDED RELEASE ORAL DAILY
Qty: 90 TABLET | Refills: 1 | Status: SHIPPED | OUTPATIENT
Start: 2023-01-17

## 2023-01-17 RX ORDER — ATORVASTATIN CALCIUM 10 MG/1
10 TABLET, FILM COATED ORAL DAILY
Qty: 90 TABLET | Refills: 1 | Status: SHIPPED | OUTPATIENT
Start: 2023-01-17

## 2023-01-17 RX ORDER — METOPROLOL SUCCINATE 25 MG/1
25 TABLET, EXTENDED RELEASE ORAL DAILY
Qty: 90 TABLET | Refills: 1 | Status: SHIPPED | OUTPATIENT
Start: 2023-01-17

## 2023-01-17 NOTE — PROGRESS NOTES
Assessment/Plan:    BMI Counseling: Body mass index is 26 93 kg/m²  The BMI is above normal  Nutrition recommendations include decreasing portion sizes, encouraging healthy choices of fruits and vegetables and decreasing fast food intake  Exercise recommendations include moderate physical activity 150 minutes/week  Rationale for BMI follow-up plan is due to patient being overweight or obese  Depression Screening and Follow-up Plan: Patient was screened for depression during today's encounter  They screened negative with a PHQ-2 score of 2             1  Acquired hypothyroidism  -     TSH, 3rd generation; Future  -     levothyroxine 112 mcg tablet; Take 1 tablet (112 mcg total) by mouth daily    2  Essential hypertension, benign  -     amLODIPine (NORVASC) 2 5 mg tablet; Take 1 tablet (2 5 mg total) by mouth daily  -     hydrochlorothiazide (HYDRODIURIL) 12 5 mg tablet; Take 1 tablet (12 5 mg total) by mouth daily  -     lisinopril (ZESTRIL) 2 5 mg tablet; Take 1 tablet (2 5 mg total) by mouth daily  -     metoprolol succinate (TOPROL-XL) 25 mg 24 hr tablet; Take 1 tablet (25 mg total) by mouth daily    3  Impaired fasting blood sugar    4  Mixed hyperlipidemia  -     atorvastatin (Lipitor) 10 mg tablet; Take 1 tablet (10 mg total) by mouth daily    5  Hypokalemia  -     potassium chloride (Klor-Con) 10 mEq tablet; Take 1 tablet (10 mEq total) by mouth in the morning    6  PAD (peripheral artery disease) ([facility] [address] )    7  Encounter for screening for malignant neoplasm of colon  -     Cologuard         Subjective:      Patient ID: Juliette Weber is a 65 y o  female  Follow-up on blood test done on 1/12/2023 test discussed with her      The following portions of the patient's history were reviewed and updated as appropriate: She  has a past medical history of Heart disease, High cholesterol, Irregular heart beat, PAD (peripheral artery disease) ([address] ), and Primary hypertension    She   Patient Active Problem List Diagnosis Date Noted   • PAD (peripheral artery disease) ([facility] [address] ) 05/10/2022   • Acquired hypothyroidism 05/10/2022   • Mixed hyperlipidemia 05/10/2022   • Essential hypertension, benign 05/10/2022   • Hypokalemia 05/10/2022   • Impaired fasting blood sugar 05/10/2022     She  has a past surgical history that includes LAPAROTOMY  Her family history includes Graves' disease in her mother; Heart disease in her father and mother; Lung cancer in her mother; Stroke in her father and mother  She  reports that she has been smoking cigarettes  She has been smoking an average of  5 packs per day  She has never used smokeless tobacco  She reports that she does not currently use alcohol  No history on file for drug use  Current Outpatient Medications   Medication Sig Dispense Refill   • amLODIPine (NORVASC) 2 5 mg tablet Take 1 tablet (2 5 mg total) by mouth daily 90 tablet 1   • aspirin (ECOTRIN LOW STRENGTH) 81 mg EC tablet Take 81 mg by mouth daily     • atorvastatin (Lipitor) 10 mg tablet Take 1 tablet (10 mg total) by mouth daily 90 tablet 1   • hydrochlorothiazide (HYDRODIURIL) 12 5 mg tablet Take 1 tablet (12 5 mg total) by mouth daily 90 tablet 1   • levothyroxine 112 mcg tablet Take 1 tablet (112 mcg total) by mouth daily 90 tablet 1   • lisinopril (ZESTRIL) 2 5 mg tablet Take 1 tablet (2 5 mg total) by mouth daily 90 tablet 1   • metoprolol succinate (TOPROL-XL) 25 mg 24 hr tablet Take 1 tablet (25 mg total) by mouth daily 90 tablet 1   • potassium chloride (Klor-Con) 10 mEq tablet Take 1 tablet (10 mEq total) by mouth in the morning 90 tablet 1   • estradiol (ESTRACE) 0 1 mg/g vaginal cream Insert 2 grams PV qhs x 2 weeks and then taper down to 2 grams PV q hs 3x/week (Patient not taking: Reported on 1/17/2023)       No current facility-administered medications for this visit       Current Outpatient Medications on File Prior to Visit   Medication Sig   • aspirin (ECOTRIN LOW STRENGTH) 81 mg EC tablet Take 81 mg by mouth daily   • [DISCONTINUED] amLODIPine (NORVASC) 2 5 mg tablet Take 1 tablet (2 5 mg total) by mouth daily   • [DISCONTINUED] atorvastatin (Lipitor) 10 mg tablet Take 1 tablet (10 mg total) by mouth daily   • [DISCONTINUED] hydrochlorothiazide (HYDRODIURIL) 12 5 mg tablet Take 1 tablet (12 5 mg total) by mouth daily   • [DISCONTINUED] levothyroxine 112 mcg tablet Take 1 tablet (112 mcg total) by mouth daily   • [DISCONTINUED] lisinopril (ZESTRIL) 2 5 mg tablet Take 1 tablet (2 5 mg total) by mouth daily   • [DISCONTINUED] metoprolol succinate (TOPROL-XL) 25 mg 24 hr tablet Take 1 tablet (25 mg total) by mouth daily   • [DISCONTINUED] potassium chloride (Klor-Con) 10 mEq tablet Take 1 tablet (10 mEq total) by mouth in the morning   • estradiol (ESTRACE) 0 1 mg/g vaginal cream Insert 2 grams PV qhs x 2 weeks and then taper down to 2 grams PV q hs 3x/week (Patient not taking: Reported on 1/17/2023)   • [DISCONTINUED] estradiol (ESTRACE) 0 1 mg/g vaginal cream INSERT 2 GM INTO THE VAGINA EVERY NIGHT AT BEDTIME X 2 WEEKS THEN TAPER DOWN TO 2 GMS AT BEDTIME 3 TIMES A WEEK     No current facility-administered medications on file prior to visit  She is allergic to penicillins       Review of Systems   Constitutional: Negative for chills and fever  HENT: Negative for congestion, ear pain and sore throat  Eyes: Negative for pain  Respiratory: Positive for cough  Negative for shortness of breath  Cardiovascular: Negative for chest pain and leg swelling  Gastrointestinal: Negative for abdominal pain, nausea and vomiting  Endocrine: Negative for polyuria  Genitourinary: Negative for difficulty urinating, frequency and urgency  Musculoskeletal: Negative for arthralgias and back pain  Skin: Negative for rash  Neurological: Negative for weakness and headaches  Psychiatric/Behavioral: Negative for sleep disturbance  The patient is not nervous/anxious            Objective:      /84 (BP Location: Left arm, Patient Position: Sitting, Cuff Size: Large)   Pulse 77   Temp (!) 97 3 °F (36 3 °C) (Temporal)   Ht 5' 3" (1 6 m)   Wt 68 9 kg (152 lb)   SpO2 99%   BMI 26 93 kg/m²     Recent Results (from the past 1344 hour(s))   Hepatitis C antibody    Collection Time: 12/30/22 12:00 AM   Result Value Ref Range    HEP C AB Non-Reactive    Lipid Panel with Direct LDL reflex    Collection Time: 01/12/23 12:27 PM   Result Value Ref Range    Total Cholesterol 153 <200 mg/dL    HDL 51 > OR = 50 mg/dL    Triglycerides 70 <150 mg/dL    LDL Calculated 86 mg/dL (calc)    Chol HDLC Ratio 3 0 <5 0 (calc)    Non-HDL Cholesterol 102 <130 mg/dL (calc)   CBC and differential    Collection Time: 01/12/23 12:27 PM   Result Value Ref Range    White Blood Cell Count 5 9 3 8 - 10 8 Thousand/uL    Red Blood Cell Count 4 24 3 80 - 5 10 Million/uL    Hemoglobin 13 4 11 7 - 15 5 g/dL    HCT 38 7 35 0 - 45 0 %    MCV 91 3 80 0 - 100 0 fL    MCH 31 6 27 0 - 33 0 pg    MCHC 34 6 32 0 - 36 0 g/dL    RDW 12 6 11 0 - 15 0 %    Platelet Count 232 342 - 400 Thousand/uL    SL AMB MPV 10 8 7 5 - 12 5 fL    Neutrophils (Absolute) 2,572 1,500 - 7,800 cells/uL    Lymphocytes (Absolute) 2,814 850 - 3,900 cells/uL    Monocytes (Absolute) 384 200 - 950 cells/uL    Eosinophils (Absolute) 83 15 - 500 cells/uL    Basophils ABS 47 0 - 200 cells/uL    Neutrophils 43 6 %    Lymphocytes 47 7 %    Monocytes 6 5 %    Eosinophils 1 4 %    Basophils PCT 0 8 %   TSH, 3rd generation    Collection Time: 01/12/23 12:27 PM   Result Value Ref Range    TSH 10 44 (H) 0 40 - 4 50 mIU/L   Hemoglobin A1c (w/out EAG)    Collection Time: 01/12/23 12:27 PM   Result Value Ref Range    Hemoglobin A1C 5 8 (H) <5 7 % of total Hgb        Physical Exam  Constitutional:       Appearance: Normal appearance  HENT:      Head: Normocephalic        Right Ear: Tympanic membrane, ear canal and external ear normal       Left Ear: Tympanic membrane, ear canal and external ear normal  Nose: Nose normal  No congestion  Mouth/Throat:      Mouth: Mucous membranes are moist       Pharynx: Oropharynx is clear  No oropharyngeal exudate or posterior oropharyngeal erythema  Eyes:      Extraocular Movements: Extraocular movements intact  Conjunctiva/sclera: Conjunctivae normal       Pupils: Pupils are equal, round, and reactive to light  Cardiovascular:      Rate and Rhythm: Normal rate and regular rhythm  Heart sounds: Normal heart sounds  No murmur heard  Pulmonary:      Effort: Pulmonary effort is normal       Breath sounds: Normal breath sounds  No wheezing or rales  Abdominal:      General: Bowel sounds are normal  There is no distension  Palpations: Abdomen is soft  Tenderness: There is no abdominal tenderness  Musculoskeletal:         General: Normal range of motion  Cervical back: Normal range of motion and neck supple  Right lower leg: No edema  Left lower leg: No edema  Lymphadenopathy:      Cervical: No cervical adenopathy  Skin:     General: Skin is warm  Neurological:      General: No focal deficit present  Mental Status: She is alert and oriented to person, place, and time

## 2023-05-12 LAB
BASOPHILS # BLD AUTO: 52 CELLS/UL (ref 0–200)
BASOPHILS NFR BLD AUTO: 0.8 %
CHOLEST SERPL-MCNC: 157 MG/DL
CHOLEST/HDLC SERPL: 3.7 (CALC)
EOSINOPHIL # BLD AUTO: 143 CELLS/UL (ref 15–500)
EOSINOPHIL NFR BLD AUTO: 2.2 %
ERYTHROCYTE [DISTWIDTH] IN BLOOD BY AUTOMATED COUNT: 12.2 % (ref 11–15)
HCT VFR BLD AUTO: 40.1 % (ref 35–45)
HDLC SERPL-MCNC: 42 MG/DL
HGB BLD-MCNC: 14.3 G/DL (ref 11.7–15.5)
LDLC SERPL CALC-MCNC: 94 MG/DL (CALC)
LYMPHOCYTES # BLD AUTO: 2893 CELLS/UL (ref 850–3900)
LYMPHOCYTES NFR BLD AUTO: 44.5 %
MCH RBC QN AUTO: 32.4 PG (ref 27–33)
MCHC RBC AUTO-ENTMCNC: 35.7 G/DL (ref 32–36)
MCV RBC AUTO: 90.9 FL (ref 80–100)
MONOCYTES # BLD AUTO: 585 CELLS/UL (ref 200–950)
MONOCYTES NFR BLD AUTO: 9 %
NEUTROPHILS # BLD AUTO: 2828 CELLS/UL (ref 1500–7800)
NEUTROPHILS NFR BLD AUTO: 43.5 %
NONHDLC SERPL-MCNC: 115 MG/DL (CALC)
PLATELET # BLD AUTO: 320 THOUSAND/UL (ref 140–400)
PMV BLD REES-ECKER: 10.4 FL (ref 7.5–12.5)
RBC # BLD AUTO: 4.41 MILLION/UL (ref 3.8–5.1)
TRIGL SERPL-MCNC: 109 MG/DL
TSH SERPL-ACNC: 4.71 MIU/L (ref 0.4–4.5)
WBC # BLD AUTO: 6.5 THOUSAND/UL (ref 3.8–10.8)

## 2023-05-16 ENCOUNTER — OFFICE VISIT (OUTPATIENT)
Dept: INTERNAL MEDICINE CLINIC | Facility: CLINIC | Age: 63
End: 2023-05-16

## 2023-05-16 VITALS
BODY MASS INDEX: 26.58 KG/M2 | OXYGEN SATURATION: 97 % | HEART RATE: 88 BPM | DIASTOLIC BLOOD PRESSURE: 80 MMHG | TEMPERATURE: 97.4 F | WEIGHT: 150 LBS | SYSTOLIC BLOOD PRESSURE: 128 MMHG | HEIGHT: 63 IN

## 2023-05-16 DIAGNOSIS — Z00.00 MEDICARE ANNUAL WELLNESS VISIT, SUBSEQUENT: ICD-10-CM

## 2023-05-16 DIAGNOSIS — I10 ESSENTIAL HYPERTENSION, BENIGN: Primary | ICD-10-CM

## 2023-05-16 DIAGNOSIS — E78.2 MIXED HYPERLIPIDEMIA: ICD-10-CM

## 2023-05-16 DIAGNOSIS — I73.9 PAD (PERIPHERAL ARTERY DISEASE) (HCC): ICD-10-CM

## 2023-05-16 DIAGNOSIS — Z12.31 ENCOUNTER FOR SCREENING MAMMOGRAM FOR MALIGNANT NEOPLASM OF BREAST: ICD-10-CM

## 2023-05-16 DIAGNOSIS — J04.10 TRACHEITIS: ICD-10-CM

## 2023-05-16 DIAGNOSIS — E03.9 ACQUIRED HYPOTHYROIDISM: ICD-10-CM

## 2023-05-16 DIAGNOSIS — Z12.11 SCREENING FOR COLON CANCER: ICD-10-CM

## 2023-05-16 DIAGNOSIS — R73.01 IMPAIRED FASTING BLOOD SUGAR: ICD-10-CM

## 2023-05-16 DIAGNOSIS — F33.9 DEPRESSION, RECURRENT (HCC): ICD-10-CM

## 2023-05-16 RX ORDER — AZITHROMYCIN 250 MG/1
TABLET, FILM COATED ORAL
Qty: 6 TABLET | Refills: 0 | Status: SHIPPED | OUTPATIENT
Start: 2023-05-16 | End: 2023-05-21

## 2023-05-16 NOTE — PROGRESS NOTES
Assessment and Plan:     Problem List Items Addressed This Visit        Endocrine    Acquired hypothyroidism    Impaired fasting blood sugar       Cardiovascular and Mediastinum    PAD (peripheral artery disease) (HCC)    Essential hypertension, benign - Primary       Other    Mixed hyperlipidemia    Medicare annual wellness visit, subsequent    Depression, recurrent ([facility] [address] )   Other Visit Diagnoses     Encounter for screening mammogram for malignant neoplasm of breast        Relevant Orders    Mammo screening bilateral w 3d & cad    Screening for colon cancer        Tracheitis        Relevant Medications    azithromycin (Zithromax) 250 mg tablet          Depression Screening and Follow-up Plan: Patient was screened for depression during today's encounter  They screened negative with a PHQ-2 score of 3  Preventive health issues were discussed with patient, and age appropriate screening tests were ordered as noted in patient's After Visit Summary  Personalized health advice and appropriate referrals for health education or preventive services given if needed, as noted in patient's After Visit Summary  History of Present Illness:     Patient presents for a Medicare Wellness Visit    Follow-up on blood test done 5/12/2023 test discussed with her, also Medicare wellness exam     Patient Care Team:  Laisha Ewing MD as PCP - [address] [place] [address] (RTE)     Review of Systems:     Review of Systems   Constitutional: Negative for chills and fever  HENT: Negative for congestion, ear pain and sore throat  Eyes: Negative for pain  Respiratory: Positive for cough  Negative for shortness of breath  Cardiovascular: Negative for chest pain and leg swelling  Gastrointestinal: Negative for abdominal pain, nausea and vomiting  Endocrine: Negative for polyuria  Genitourinary: Negative for difficulty urinating, frequency and urgency  Musculoskeletal: Negative for arthralgias and back pain     Skin: Negative for rash  Neurological: Negative for weakness and headaches  Psychiatric/Behavioral: Negative for sleep disturbance  The patient is not nervous/anxious  Problem List:     Patient Active Problem List   Diagnosis   • PAD (peripheral artery disease) ([facility])   • Acquired hypothyroidism   • Mixed hyperlipidemia   • Essential hypertension, benign   • Hypokalemia   • Impaired fasting blood sugar   • Medicare annual wellness visit, subsequent   • Depression, recurrent ([place] [id number] )      Past Medical and Surgical History:     Past Medical History:   Diagnosis Date   • Heart disease    • High cholesterol    • Irregular heart beat    • PAD (peripheral artery disease) ([facility] )    • Primary hypertension      Past Surgical History:   Procedure Laterality Date   • LAPAROTOMY      FOR FALLOPIAN TUBE      Family History:     Family History   Problem Relation Age of Onset   • Heart disease Mother    • Graves' disease Mother    • Stroke Mother    • Lung cancer Mother    • Stroke Father    • Heart disease Father       Social History:     Social History     Socioeconomic History   • Marital status: /Civil Union     Spouse name: None   • Number of children: None   • Years of education: None   • Highest education level: None   Occupational History   • None   Tobacco Use   • Smoking status: Every Day     Packs/day: 0 50     Types: Cigarettes   • Smokeless tobacco: Never   Substance and Sexual Activity   • Alcohol use: Not Currently   • Drug use: None   • Sexual activity: None   Other Topics Concern   • None   Social History Narrative   • None     Social Determinants of Health     Financial Resource Strain: Low Risk    • Difficulty of Paying Living Expenses: Not hard at all   Food Insecurity: Not on file   Transportation Needs: No Transportation Needs   • Lack of Transportation (Medical): No   • Lack of Transportation (Non-Medical):  No   Physical Activity: Not on file   Stress: Not on file   Social Connections: Not on file   Intimate Partner Violence: Not on file   Housing Stability: Not on file      Medications and Allergies:     Current Outpatient Medications   Medication Sig Dispense Refill   • aspirin (ECOTRIN LOW STRENGTH) 81 mg EC tablet Take 81 mg by mouth daily     • atorvastatin (Lipitor) 10 mg tablet Take 1 tablet (10 mg total) by mouth daily 90 tablet 1   • azithromycin (Zithromax) 250 mg tablet Take 2 tablets (500 mg total) by mouth daily for 1 day, THEN 1 tablet (250 mg total) daily for 4 days  6 tablet 0   • hydrochlorothiazide (HYDRODIURIL) 12 5 mg tablet Take 1 tablet (12 5 mg total) by mouth daily 90 tablet 1   • levothyroxine 112 mcg tablet Take 1 tablet (112 mcg total) by mouth daily 90 tablet 1   • lisinopril (ZESTRIL) 2 5 mg tablet Take 1 tablet (2 5 mg total) by mouth daily 90 tablet 1   • metoprolol succinate (TOPROL-XL) 25 mg 24 hr tablet Take 1 tablet (25 mg total) by mouth daily 90 tablet 1   • potassium chloride (Klor-Con) 10 mEq tablet Take 1 tablet (10 mEq total) by mouth in the morning 90 tablet 1   • estradiol (ESTRACE) 0 1 mg/g vaginal cream  (Patient not taking: Reported on 5/16/2023)       No current facility-administered medications for this visit  Allergies   Allergen Reactions   • Penicillins Hives and Itching      Immunizations: There is no immunization history on file for this patient  Health Maintenance:         Topic Date Due   • HIV Screening  Never done   • Cervical Cancer Screening  Never done   • Breast Cancer Screening: Mammogram  Never done   • Colorectal Cancer Screening  Never done   • Hepatitis C Screening  Completed         Topic Date Due   • COVID-19 Vaccine (1) Never done   • Pneumococcal Vaccine: Pediatrics (0 to 5 Years) and At-Risk Patients (6 to 59 Years) (1 - PCV) Never done      Medicare Screening Tests and Risk Assessments:     Carmen Juarez is here for her Subsequent Wellness visit  Health Risk Assessment:   Patient rates overall health as good   Patient feels that their physical health rating is same  Patient is satisfied with their life  Eyesight was rated as slightly worse  Hearing was rated as same  Patient feels that their emotional and mental health rating is same  Patients states they are sometimes angry  Patient states they are always unusually tired/fatigued  Pain experienced in the last 7 days has been none  Patient states that she has experienced no weight loss or gain in last 6 months  Depression Screening:   PHQ-2 Score: 3  PHQ-9 Score: 6      Fall Risk Screening: In the past year, patient has experienced: no history of falling in past year      Urinary Incontinence Screening:   Patient has not leaked urine accidently in the last six months  Home Safety:  Patient does not have trouble with stairs inside or outside of their home  Patient has working smoke alarms and has no working carbon monoxide detector  Home safety hazards include: none  Medications:   Patient is not currently taking any over-the-counter supplements  Patient is able to manage medications  Activities of Daily Living (ADLs)/Instrumental Activities of Daily Living (IADLs):   Walk and transfer into and out of bed and chair?: Yes  Dress and groom yourself?: Yes    Bathe or shower yourself?: Yes    Feed yourself?  Yes  Do your laundry/housekeeping?: Yes  Manage your money, pay your bills and track your expenses?: Yes  Make your own meals?: Yes    Do your own shopping?: Yes    Previous Hospitalizations:   Any hospitalizations or ED visits within the last 12 months?: No      Advance Care Planning:   Living will: No    Durable POA for healthcare: No    Advanced directive: No    Five wishes given: Yes      Cognitive Screening:   Provider or family/friend/caregiver concerned regarding cognition?: No    PREVENTIVE SCREENINGS      Cardiovascular Screening:    General: Screening Not Indicated and History Lipid Disorder      Diabetes Screening:     General: Screening Current      Lung Cancer "Screening:     General: Screening Not Indicated      Hepatitis C Screening:    General: Screening Current    Screening, Brief Intervention, and Referral to Treatment (SBIRT)    Screening  Typical number of drinks in a day: 0  Typical number of drinks in a week: 0  Interpretation: Low risk drinking behavior  Single Item Drug Screening:  How often have you used an illegal drug (including marijuana) or a prescription medication for non-medical reasons in the past year? never    Single Item Drug Screen Score: 0  Interpretation: Negative screen for possible drug use disorder    No results found  Physical Exam:     /80 (BP Location: Left arm, Patient Position: Standing, Cuff Size: Standard)   Pulse 88   Temp (!) 97 4 °F (36 3 °C) (Temporal)   Ht 5' 3\" (1 6 m)   Wt 68 kg (150 lb)   SpO2 97%   BMI 26 57 kg/m²     Physical Exam  Vitals and nursing note reviewed  Constitutional:       General: She is not in acute distress  Appearance: She is well-developed  HENT:      Head: Normocephalic and atraumatic  Right Ear: Tympanic membrane, ear canal and external ear normal       Left Ear: Tympanic membrane, ear canal and external ear normal       Mouth/Throat:      Pharynx: Oropharynx is clear  Eyes:      Extraocular Movements: Extraocular movements intact  Conjunctiva/sclera: Conjunctivae normal    Cardiovascular:      Rate and Rhythm: Normal rate and regular rhythm  Heart sounds: Normal heart sounds  No murmur heard  Pulmonary:      Effort: Pulmonary effort is normal  No respiratory distress  Breath sounds: Normal breath sounds  Abdominal:      General: Abdomen is flat  Palpations: Abdomen is soft  Tenderness: There is no abdominal tenderness  Musculoskeletal:         General: No swelling  Cervical back: Neck supple  Right lower leg: No edema  Left lower leg: No edema  Skin:     General: Skin is warm and dry        Capillary Refill: Capillary refill " takes less than 2 seconds  Neurological:      Mental Status: She is alert and oriented to person, place, and time     Psychiatric:         Mood and Affect: Mood normal           Beni Nunez MD

## 2023-07-15 PROBLEM — Z00.00 MEDICARE ANNUAL WELLNESS VISIT, SUBSEQUENT: Status: RESOLVED | Noted: 2022-05-10 | Resolved: 2023-07-15

## 2023-07-18 DIAGNOSIS — E87.6 HYPOKALEMIA: ICD-10-CM

## 2023-07-18 DIAGNOSIS — I10 ESSENTIAL HYPERTENSION, BENIGN: ICD-10-CM

## 2023-07-18 DIAGNOSIS — E78.2 MIXED HYPERLIPIDEMIA: ICD-10-CM

## 2023-07-18 DIAGNOSIS — E03.9 ACQUIRED HYPOTHYROIDISM: ICD-10-CM

## 2023-07-18 RX ORDER — LEVOTHYROXINE SODIUM 112 UG/1
TABLET ORAL
Qty: 90 TABLET | Refills: 1 | OUTPATIENT
Start: 2023-07-18

## 2023-07-18 RX ORDER — METOPROLOL SUCCINATE 25 MG/1
TABLET, EXTENDED RELEASE ORAL
Qty: 90 TABLET | Refills: 1 | OUTPATIENT
Start: 2023-07-18

## 2023-07-18 RX ORDER — ATORVASTATIN CALCIUM 10 MG/1
TABLET, FILM COATED ORAL
Qty: 90 TABLET | Refills: 1 | OUTPATIENT
Start: 2023-07-18

## 2023-07-18 RX ORDER — POTASSIUM CHLORIDE 750 MG/1
TABLET, FILM COATED, EXTENDED RELEASE ORAL
Qty: 90 TABLET | Refills: 1 | OUTPATIENT
Start: 2023-07-18

## 2023-07-19 DIAGNOSIS — I10 ESSENTIAL HYPERTENSION, BENIGN: ICD-10-CM

## 2023-07-19 RX ORDER — LISINOPRIL 2.5 MG/1
TABLET ORAL
Qty: 90 TABLET | Refills: 1 | OUTPATIENT
Start: 2023-07-19

## 2023-07-30 DIAGNOSIS — I10 ESSENTIAL HYPERTENSION, BENIGN: ICD-10-CM

## 2023-07-31 RX ORDER — HYDROCHLOROTHIAZIDE 12.5 MG/1
TABLET ORAL
Qty: 90 TABLET | Refills: 1 | OUTPATIENT
Start: 2023-07-31

## 2023-08-11 DIAGNOSIS — E03.9 ACQUIRED HYPOTHYROIDISM: ICD-10-CM

## 2023-08-11 RX ORDER — LEVOTHYROXINE SODIUM 112 UG/1
112 TABLET ORAL DAILY
Qty: 90 TABLET | Refills: 1 | Status: SHIPPED | OUTPATIENT
Start: 2023-08-11

## 2023-09-13 ENCOUNTER — RA CDI HCC (OUTPATIENT)
Dept: OTHER | Facility: HOSPITAL | Age: 63
End: 2023-09-13

## 2023-09-13 NOTE — PROGRESS NOTES
[address] [facility] coding opportunities       Chart reviewed, no opportunity found: CHART REVIEWED, NO OPPORTUNITY FOUND        Patients Insurance     Medicare [place]: [facility] [place]

## 2023-12-28 ENCOUNTER — TELEPHONE (OUTPATIENT)
Dept: INTERNAL MEDICINE CLINIC | Facility: CLINIC | Age: 63
End: 2023-12-28

## 2023-12-29 NOTE — TELEPHONE ENCOUNTER
Called patient back and had to leave a message that she needed to speak with dr Moore when he gets back  ,

## 2024-01-01 DIAGNOSIS — M51.36 DDD (DEGENERATIVE DISC DISEASE), LUMBAR: Primary | ICD-10-CM

## 2024-01-03 ENCOUNTER — OFFICE VISIT (OUTPATIENT)
Dept: PHYSICAL THERAPY | Facility: REHABILITATION | Age: 64
End: 2024-01-03
Payer: COMMERCIAL

## 2024-01-03 DIAGNOSIS — M51.36 DDD (DEGENERATIVE DISC DISEASE), LUMBAR: Primary | ICD-10-CM

## 2024-01-03 PROCEDURE — 97162 PT EVAL MOD COMPLEX 30 MIN: CPT | Performed by: PHYSICAL THERAPIST

## 2024-01-03 PROCEDURE — 97110 THERAPEUTIC EXERCISES: CPT | Performed by: PHYSICAL THERAPIST

## 2024-01-03 NOTE — PROGRESS NOTES
PT Evaluation     Today's date: 1/3/2024  Patient name: Nara Espino  : 1960  MRN: [id number]  Referring provider: Martinez Moore MD   Dx:   Encounter Diagnosis     ICD-10-CM    1. DDD (degenerative disc disease), lumbar  M51.36                      Assessment  Assessment details: Patient presents with pain, decreased lumbar ROM, decreased LE flexibility, decreased hip/core strength, and decreased function secondary to lumbar DDD.  Patient would benefit from skilled PT intervention to address these issues and to maximize function.  Thank you for the referral.  Impairments: abnormal or restricted ROM, activity intolerance, impaired physical strength, lacks appropriate home exercise program, pain with function and poor body mechanics  Other impairment: impaired LE flexibility  Understanding of Dx/Px/POC: good   Prognosis: good    Goals  Short Term:  Pt will report decreased levels of pain by at least 2 subjective ratings in 4 weeks  Pt will demonstrate improved ROM by at least 25% in 4 weeks  Pt will demonstrate improved strength by 1/2 grade MMT in 4 weeks  Long Term:   Pt will be independent in their HEP in 8 weeks  Pt will demonstrate improved FOTO, > predicted level  Pt will be independent with all ADL's    Plan  Plan details: Patient was educated in HEP and Plan of Care.  All questions were answered to pt's satisfaction.      Patient would benefit from: skilled physical therapy  Planned therapy interventions: abdominal trunk stabilization, body mechanics training, joint mobilization, manual therapy, neuromuscular re-education, patient education, postural training, flexibility, strengthening, stretching, therapeutic activities, therapeutic exercise, graded exercise and home exercise program  Frequency: 2x week  Duration in weeks: 8  Plan of Care beginning date: 1/3/2024  Plan of Care expiration date: 3/1/2024  Treatment plan discussed with: patient    Subjective Evaluation    History of Present  Illness  Mechanism of injury: Pt is a 63 y.o female with a c/o ongoing LBP for years 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  Pt retired in 2018 due to her LBP.  Pt reports an increase in pain recently, which pt attributes to lifting her 4 y.o autistic grandson.  Pt denies any prior treatment to her lumbar spine.  Pt now presents to PT.  Pt reports pain/difficulty with lifting her grandson, ambulation (limited tolerance), sitting, sit to stand transfers, stairs, ambulating uphill.    Patient Goals  Patient goals for therapy: decreased pain and independence with ADLs/IADLs    Pain  At best pain ratin  At worst pain rating: 10  Location: lumbar spine      Diagnostic Tests  No diagnostic tests performed  Treatments  No previous or current treatments    Objective     Concurrent Complaints  Negative for bladder dysfunction, bowel dysfunction and saddle (S4) numbness    Additional Special Questions  Pt denies unexplained weight loss.      Tenderness     Additional Tenderness Details  No TTP noted    Neurological Testing     Reflexes   Left   Patellar (L4): normal (2+)  Achilles (S1): normal (2+)  Clonus sign: negative    Right   Patellar (L4): normal (2+)  Achilles (S1): normal (2+)  Clonus sign: negative    Additional Neurological Details  Pt denies N/T    Active Range of Motion     Additional Active Range of Motion Details  L/S AROM (% of normal):  Flex=50% with pain; repeated=no change  Ext= 50% with pain; repeated=no change  RSB=wfl with pain  LSB=wfl with mild pain  R rot=75% with pain  L rot=75% with pain    Joint Play   Joints within functional limits: L1, L2 and L3     Pain: L4     Strength/Myotome Testing     Left Hip   Planes of Motion   Flexion: 4-  Extension: 4-  Abduction: 4  External rotation: 4-    Right Hip   Planes of Motion   Flexion: 4-  Extension: 4-  Abduction: 4  External rotation: 4-    Left Knee   Flexion: 5  Extension: 5    Right Knee   Flexion: 5  Extension: 5    Left Ankle/Foot    Dorsiflexion: 5  Plantar flexion: 5 (seated)  Great toe extension: 5    Right Ankle/Foot   Dorsiflexion: 5  Plantar flexion: 5 (seated)  Great toe extension: 5    Tests     Lumbar   Positive prone instability .   Negative SIJ compression.     Left   Negative crossed SLR, femoral stretch, passive SLR and slump test.     Right   Negative crossed SLR, femoral stretch, passive SLR and slump test.     Left Pelvic Girdle/Sacrum   Positive: active SLR test.     Right Pelvic Girdle/Sacrum   Negative: active SLR test.     Additional Tests Details  (+)Active SLR w/stabilization on L    General Comments:      Lumbar Comments  Decreased flexibility b/l HS and piriformis.             Precautions: HTN, heart disease, PAD  POC expires Unit limit Auth Expiration date PT/OT + Visit Limit?   3/1/24 BOMN NA BOMN              Pertinent Findings:                                                                                     Test / Measure  1/3/2024   FOTO (Predicted 58) 50                       Visits 1            Manuals                                                                 Neuro Re-Ed             Standing pball crush             Standing pball crush w/march             TA w/LPD             TA w/unilateral row             TA w/multifidus press                                       Ther Ex             TM amb or NS             LTR             TA w/bridge             TA w/clamshells             TA w/SLR             TA w/prone hip ext             Pt education+ HEP instruction TP 8 mins                         Ther Activity             Sit to stand             Mini squats             Fwd step ups             Suitcase carry             Gait Training                                       Modalities

## 2024-01-12 ENCOUNTER — OFFICE VISIT (OUTPATIENT)
Dept: INTERNAL MEDICINE CLINIC | Facility: CLINIC | Age: 64
End: 2024-01-12
Payer: COMMERCIAL

## 2024-01-12 VITALS
OXYGEN SATURATION: 96 % | DIASTOLIC BLOOD PRESSURE: 81 MMHG | WEIGHT: 195 LBS | BODY MASS INDEX: 34.55 KG/M2 | SYSTOLIC BLOOD PRESSURE: 138 MMHG | HEART RATE: 78 BPM | HEIGHT: 63 IN | TEMPERATURE: 97.6 F

## 2024-01-12 DIAGNOSIS — I10 ESSENTIAL HYPERTENSION, BENIGN: Primary | ICD-10-CM

## 2024-01-12 DIAGNOSIS — F33.9 DEPRESSION, RECURRENT (HCC): ICD-10-CM

## 2024-01-12 DIAGNOSIS — M54.50 LOW BACK PAIN AT MULTIPLE SITES: ICD-10-CM

## 2024-01-12 DIAGNOSIS — Z12.31 SCREENING MAMMOGRAM FOR HIGH-RISK PATIENT: ICD-10-CM

## 2024-01-12 DIAGNOSIS — R73.01 IMPAIRED FASTING BLOOD SUGAR: ICD-10-CM

## 2024-01-12 DIAGNOSIS — M47.816 SPONDYLOSIS OF LUMBAR REGION WITHOUT MYELOPATHY OR RADICULOPATHY: ICD-10-CM

## 2024-01-12 DIAGNOSIS — E78.2 MIXED HYPERLIPIDEMIA: ICD-10-CM

## 2024-01-12 DIAGNOSIS — E03.9 ACQUIRED HYPOTHYROIDISM: ICD-10-CM

## 2024-01-12 DIAGNOSIS — Z12.11 SCREEN FOR COLON CANCER: ICD-10-CM

## 2024-01-12 DIAGNOSIS — I73.9 PAD (PERIPHERAL ARTERY DISEASE) (HCC): ICD-10-CM

## 2024-01-12 PROCEDURE — 99214 OFFICE O/P EST MOD 30 MIN: CPT | Performed by: INTERNAL MEDICINE

## 2024-01-12 RX ORDER — AMLODIPINE BESYLATE 2.5 MG/1
2.5 TABLET ORAL DAILY
COMMUNITY
Start: 2023-08-09

## 2024-01-12 NOTE — PROGRESS NOTES
Assessment/Plan:      Tobacco Cessation Counseling: Tobacco cessation counseling was provided. The patient is sincerely urged to quit consumption of tobacco. She is ready to quit tobacco.             1. Essential hypertension, benign  -     CBC and differential; Future    2. Screen for colon cancer  -     Cologuard    3. Screening mammogram for high-risk patient  -     Mammo screening bilateral w 3d & cad; Future; Expected date: 01/12/2024    4. Acquired hypothyroidism  -     TSH, 3rd generation; Future    5. Impaired fasting blood sugar  -     Hemoglobin A1C; Future    6. Mixed hyperlipidemia  -     Comprehensive metabolic panel; Future  -     Lipid Panel with Direct LDL reflex; Future    7. Depression, recurrent (HCC)    8. PAD (peripheral artery disease) (HCC)    9. Low back pain at multiple sites    10. Spondylosis of lumbar region without myelopathy or radiculopathy           Subjective:      Patient ID: Nara Espino is a 63 y.o. female.    Follow-up on multiple medical problems to ensure they are stable on current medications        The following portions of the patient's history were reviewed and updated as appropriate: She  has a past medical history of Heart disease, High cholesterol, Irregular heart beat, PAD (peripheral artery disease) (HCC), and Primary hypertension.  She   Patient Active Problem List    Diagnosis Date Noted    Spondylosis of lumbar region without myelopathy or radiculopathy 01/12/2024    Low back pain at multiple sites 01/12/2024    Depression, recurrent (HCC) 05/16/2023    PAD (peripheral artery disease) (HCC) 05/10/2022    Acquired hypothyroidism 05/10/2022    Mixed hyperlipidemia 05/10/2022    Essential hypertension, benign 05/10/2022    Hypokalemia 05/10/2022    Impaired fasting blood sugar 05/10/2022     She  has a past surgical history that includes LAPAROTOMY.  Her family history includes Graves' disease in her mother; Heart disease in her father and mother; Lung cancer in her  mother; Stroke in her father and mother.  She  reports that she has been smoking cigarettes. She has never used smokeless tobacco. She reports that she does not currently use alcohol. No history on file for drug use.  Current Outpatient Medications   Medication Sig Dispense Refill    amLODIPine (NORVASC) 2.5 mg tablet Take 2.5 mg by mouth daily      aspirin (ECOTRIN LOW STRENGTH) 81 mg EC tablet Take 81 mg by mouth daily      atorvastatin (Lipitor) 10 mg tablet Take 1 tablet (10 mg total) by mouth daily 90 tablet 1    hydrochlorothiazide (HYDRODIURIL) 12.5 mg tablet Take 1 tablet (12.5 mg total) by mouth daily 90 tablet 1    levothyroxine 112 mcg tablet Take 1 tablet (112 mcg total) by mouth daily 90 tablet 1    lisinopril (ZESTRIL) 2.5 mg tablet Take 1 tablet (2.5 mg total) by mouth daily 90 tablet 1    metoprolol succinate (TOPROL-XL) 25 mg 24 hr tablet Take 1 tablet (25 mg total) by mouth daily 90 tablet 1    potassium chloride (Klor-Con) 10 mEq tablet Take 1 tablet (10 mEq total) by mouth in the morning 90 tablet 1     No current facility-administered medications for this visit.     Current Outpatient Medications on File Prior to Visit   Medication Sig    amLODIPine (NORVASC) 2.5 mg tablet Take 2.5 mg by mouth daily    aspirin (ECOTRIN LOW STRENGTH) 81 mg EC tablet Take 81 mg by mouth daily    atorvastatin (Lipitor) 10 mg tablet Take 1 tablet (10 mg total) by mouth daily    hydrochlorothiazide (HYDRODIURIL) 12.5 mg tablet Take 1 tablet (12.5 mg total) by mouth daily    levothyroxine 112 mcg tablet Take 1 tablet (112 mcg total) by mouth daily    lisinopril (ZESTRIL) 2.5 mg tablet Take 1 tablet (2.5 mg total) by mouth daily    metoprolol succinate (TOPROL-XL) 25 mg 24 hr tablet Take 1 tablet (25 mg total) by mouth daily    potassium chloride (Klor-Con) 10 mEq tablet Take 1 tablet (10 mEq total) by mouth in the morning    [DISCONTINUED] estradiol (ESTRACE) 0.1 mg/g vaginal cream  (Patient not taking: Reported on  "5/16/2023)     No current facility-administered medications on file prior to visit.     She is allergic to penicillins..    Review of Systems   Constitutional:  Negative for chills and fever.   HENT:  Negative for congestion, ear pain and sore throat.    Eyes:  Negative for pain.   Respiratory:  Negative for cough and shortness of breath.    Cardiovascular:  Negative for chest pain and leg swelling.   Gastrointestinal:  Negative for abdominal pain, nausea and vomiting.   Endocrine: Negative for polyuria.   Genitourinary:  Negative for difficulty urinating, frequency and urgency.   Musculoskeletal:  Positive for arthralgias and back pain.   Skin:  Negative for rash.   Neurological:  Negative for weakness and headaches.   Psychiatric/Behavioral:  Negative for sleep disturbance. The patient is not nervous/anxious.          Objective:      /81 (BP Location: Left arm, Patient Position: Sitting, Cuff Size: Large)   Pulse 78   Temp 97.6 °F (36.4 °C) (Temporal)   Ht 5' 3\" (1.6 m)   Wt 88.5 kg (195 lb)   SpO2 96%   BMI 34.54 kg/m²     No results found for this or any previous visit (from the past 1344 hour(s)).     Physical Exam  Constitutional:       Appearance: Normal appearance.   HENT:      Head: Normocephalic.      Right Ear: External ear normal.      Left Ear: External ear normal.      Nose: Nose normal. No congestion.      Mouth/Throat:      Mouth: Mucous membranes are moist.      Pharynx: Oropharynx is clear. No oropharyngeal exudate or posterior oropharyngeal erythema.   Eyes:      Extraocular Movements: Extraocular movements intact.      Conjunctiva/sclera: Conjunctivae normal.   Cardiovascular:      Rate and Rhythm: Normal rate and regular rhythm.      Heart sounds: Normal heart sounds. No murmur heard.  Pulmonary:      Effort: Pulmonary effort is normal.      Breath sounds: Normal breath sounds. No wheezing or rales.   Abdominal:      General: There is no distension.      Palpations: Abdomen is soft. "      Tenderness: There is no abdominal tenderness.   Musculoskeletal:         General: Normal range of motion.      Cervical back: Normal range of motion and neck supple.      Right lower leg: No edema.      Left lower leg: No edema.   Lymphadenopathy:      Cervical: No cervical adenopathy.   Skin:     General: Skin is warm.   Neurological:      General: No focal deficit present.      Mental Status: She is alert and oriented to person, place, and time.

## 2024-01-17 ENCOUNTER — APPOINTMENT (OUTPATIENT)
Dept: PHYSICAL THERAPY | Facility: REHABILITATION | Age: 64
End: 2024-01-17
Payer: COMMERCIAL

## 2024-01-18 VITALS
TEMPERATURE: 97.6 F | HEART RATE: 78 BPM | WEIGHT: 154 LBS | HEIGHT: 63 IN | DIASTOLIC BLOOD PRESSURE: 81 MMHG | OXYGEN SATURATION: 96 % | SYSTOLIC BLOOD PRESSURE: 138 MMHG | BODY MASS INDEX: 27.29 KG/M2

## 2024-03-25 ENCOUNTER — VBI (OUTPATIENT)
Dept: ADMINISTRATIVE | Facility: OTHER | Age: 64
End: 2024-03-25

## 2024-04-08 ENCOUNTER — RA CDI HCC (OUTPATIENT)
Dept: OTHER | Facility: HOSPITAL | Age: 64
End: 2024-04-08

## 2024-04-11 ENCOUNTER — TELEPHONE (OUTPATIENT)
Dept: INTERNAL MEDICINE CLINIC | Facility: CLINIC | Age: 64
End: 2024-04-11

## 2024-05-30 ENCOUNTER — TELEPHONE (OUTPATIENT)
Dept: INTERNAL MEDICINE CLINIC | Facility: CLINIC | Age: 64
End: 2024-05-30

## 2024-06-14 ENCOUNTER — VBI (OUTPATIENT)
Dept: ADMINISTRATIVE | Facility: OTHER | Age: 64
End: 2024-06-14

## 2024-06-14 NOTE — TELEPHONE ENCOUNTER
06/14/24 8:31 AM     Chart reviewed for Mammogram ; nothing is submitted to the patient's insurance at this time.     Amanda Mack MA   [facility]

## 2024-07-29 ENCOUNTER — RA CDI HCC (OUTPATIENT)
Dept: OTHER | Facility: HOSPITAL | Age: 64
End: 2024-07-29

## 2024-07-31 LAB
ALBUMIN SERPL-MCNC: 4.6 G/DL (ref 3.6–5.1)
ALBUMIN/GLOB SERPL: 1.7 (CALC) (ref 1–2.5)
ALP SERPL-CCNC: 74 U/L (ref 37–153)
ALT SERPL-CCNC: 10 U/L (ref 6–29)
AST SERPL-CCNC: 15 U/L (ref 10–35)
BASOPHILS # BLD AUTO: 58 CELLS/UL (ref 0–200)
BASOPHILS NFR BLD AUTO: 1 %
BILIRUB SERPL-MCNC: 0.5 MG/DL (ref 0.2–1.2)
BUN SERPL-MCNC: 15 MG/DL (ref 7–25)
BUN/CREAT SERPL: ABNORMAL (CALC) (ref 6–22)
CALCIUM SERPL-MCNC: 10.1 MG/DL (ref 8.6–10.4)
CHLORIDE SERPL-SCNC: 101 MMOL/L (ref 98–110)
CHOLEST SERPL-MCNC: 153 MG/DL
CHOLEST/HDLC SERPL: 3.6 (CALC)
CO2 SERPL-SCNC: 27 MMOL/L (ref 20–32)
CREAT SERPL-MCNC: 0.71 MG/DL (ref 0.5–1.05)
EOSINOPHIL # BLD AUTO: 139 CELLS/UL (ref 15–500)
EOSINOPHIL NFR BLD AUTO: 2.4 %
ERYTHROCYTE [DISTWIDTH] IN BLOOD BY AUTOMATED COUNT: 12.4 % (ref 11–15)
GFR/BSA.PRED SERPLBLD CYS-BASED-ARV: 95 ML/MIN/1.73M2
GLOBULIN SER CALC-MCNC: 2.7 G/DL (CALC) (ref 1.9–3.7)
GLUCOSE SERPL-MCNC: 128 MG/DL (ref 65–99)
HBA1C MFR BLD: 6.7 % OF TOTAL HGB
HCT VFR BLD AUTO: 36.3 % (ref 35–45)
HDLC SERPL-MCNC: 43 MG/DL
HGB BLD-MCNC: 12.5 G/DL (ref 11.7–15.5)
LDLC SERPL CALC-MCNC: 91 MG/DL (CALC)
LYMPHOCYTES # BLD AUTO: 2459 CELLS/UL (ref 850–3900)
LYMPHOCYTES NFR BLD AUTO: 42.4 %
MCH RBC QN AUTO: 30.9 PG (ref 27–33)
MCHC RBC AUTO-ENTMCNC: 34.4 G/DL (ref 32–36)
MCV RBC AUTO: 89.9 FL (ref 80–100)
MONOCYTES # BLD AUTO: 545 CELLS/UL (ref 200–950)
MONOCYTES NFR BLD AUTO: 9.4 %
NEUTROPHILS # BLD AUTO: 2598 CELLS/UL (ref 1500–7800)
NEUTROPHILS NFR BLD AUTO: 44.8 %
NONHDLC SERPL-MCNC: 110 MG/DL (CALC)
PLATELET # BLD AUTO: 295 THOUSAND/UL (ref 140–400)
PMV BLD REES-ECKER: 10.4 FL (ref 7.5–12.5)
POTASSIUM SERPL-SCNC: 4 MMOL/L (ref 3.5–5.3)
PROT SERPL-MCNC: 7.3 G/DL (ref 6.1–8.1)
RBC # BLD AUTO: 4.04 MILLION/UL (ref 3.8–5.1)
SODIUM SERPL-SCNC: 139 MMOL/L (ref 135–146)
TRIGL SERPL-MCNC: 93 MG/DL
TSH SERPL-ACNC: 1.39 MIU/L (ref 0.4–4.5)
WBC # BLD AUTO: 5.8 THOUSAND/UL (ref 3.8–10.8)

## 2024-08-02 ENCOUNTER — OFFICE VISIT (OUTPATIENT)
Dept: INTERNAL MEDICINE CLINIC | Facility: CLINIC | Age: 64
End: 2024-08-02
Payer: COMMERCIAL

## 2024-08-02 VITALS
BODY MASS INDEX: 26.05 KG/M2 | TEMPERATURE: 97.4 F | SYSTOLIC BLOOD PRESSURE: 136 MMHG | HEART RATE: 91 BPM | OXYGEN SATURATION: 97 % | WEIGHT: 147 LBS | DIASTOLIC BLOOD PRESSURE: 82 MMHG | HEIGHT: 63 IN

## 2024-08-02 DIAGNOSIS — E11.9 TYPE 2 DIABETES, HBA1C GOAL < 7% (HCC): Primary | ICD-10-CM

## 2024-08-02 DIAGNOSIS — I10 ESSENTIAL HYPERTENSION, BENIGN: ICD-10-CM

## 2024-08-02 DIAGNOSIS — E78.2 MIXED HYPERLIPIDEMIA: ICD-10-CM

## 2024-08-02 DIAGNOSIS — E87.6 HYPOKALEMIA: ICD-10-CM

## 2024-08-02 DIAGNOSIS — E03.9 ACQUIRED HYPOTHYROIDISM: ICD-10-CM

## 2024-08-02 PROCEDURE — G2211 COMPLEX E/M VISIT ADD ON: HCPCS | Performed by: INTERNAL MEDICINE

## 2024-08-02 PROCEDURE — 99214 OFFICE O/P EST MOD 30 MIN: CPT | Performed by: INTERNAL MEDICINE

## 2024-08-02 NOTE — PROGRESS NOTES
Assessment/Plan:      Depression Screening and Follow-up Plan: Patient's depression screening was positive with a PHQ-9 score of 6. Patient assessed for underlying major depression. Brief counseling provided and recommend additional follow-up/re-evaluation next office visit.     Tobacco Cessation Counseling: Tobacco cessation counseling was provided. The patient is sincerely urged to quit consumption of tobacco. She is ready to quit tobacco.             1. Type 2 diabetes, HbA1c goal < 7% ([facility])  Comments:  diet and exercise  Orders:  -     Albumin / creatinine urine ratio; Future  -     CBC and differential; Future  -     Comprehensive metabolic panel; Future  -     Lipid Panel with Direct LDL reflex; Future  -     TSH, 3rd generation; Future  -     Hemoglobin A1C; Future  2. Acquired hypothyroidism  Comments:  Continue same medication  Orders:  -     TSH, 3rd generation; Future  3. Essential hypertension, benign  Comments:  Continue same medication  Orders:  -     CBC and differential; Future  -     Comprehensive metabolic panel; Future  -     Lipid Panel with Direct LDL reflex; Future  -     TSH, 3rd generation; Future  4. Mixed hyperlipidemia  Comments:  Continue same medication  Orders:  -     Comprehensive metabolic panel; Future  -     Lipid Panel with Direct LDL reflex; Future  -     TSH, 3rd generation; Future  5. Hypokalemia         Subjective:      Patient ID: Nara Espino is a 64 y.o. female.    Follow-up on blood test done on 7/31/2024 test discussed with her        The following portions of the patient's history were reviewed and updated as appropriate: She  has a past medical history of Heart disease, High cholesterol, Irregular heart beat, PAD (peripheral artery disease) ([facility]), and Primary hypertension.  She   Patient Active Problem List    Diagnosis Date Noted    Spondylosis of lumbar region without myelopathy or radiculopathy 01/12/2024    Low back pain at multiple sites 01/12/2024    Depression,  recurrent (HCC) 05/16/2023    PAD (peripheral artery disease) (HCC) 05/10/2022    Acquired hypothyroidism 05/10/2022    Mixed hyperlipidemia 05/10/2022    Essential hypertension, benign 05/10/2022    Hypokalemia 05/10/2022    Impaired fasting blood sugar 05/10/2022     She  has a past surgical history that includes LAPAROTOMY.  Her family history includes Graves' disease in her mother; Heart disease in her father and mother; Lung cancer in her mother; Stroke in her father and mother.  She  reports that she has been smoking cigarettes. She has never been exposed to tobacco smoke. She has never used smokeless tobacco. She reports that she does not currently use alcohol. No history on file for drug use.  Current Outpatient Medications   Medication Sig Dispense Refill    amLODIPine (NORVASC) 2.5 mg tablet Take 2.5 mg by mouth daily      aspirin (ECOTRIN LOW STRENGTH) 81 mg EC tablet Take 81 mg by mouth daily      atorvastatin (Lipitor) 10 mg tablet Take 1 tablet (10 mg total) by mouth daily 90 tablet 1    hydrochlorothiazide (HYDRODIURIL) 12.5 mg tablet Take 1 tablet (12.5 mg total) by mouth daily 90 tablet 1    levothyroxine 112 mcg tablet Take 1 tablet (112 mcg total) by mouth daily 90 tablet 1    lisinopril (ZESTRIL) 2.5 mg tablet Take 1 tablet (2.5 mg total) by mouth daily 90 tablet 1    metoprolol succinate (TOPROL-XL) 25 mg 24 hr tablet Take 1 tablet (25 mg total) by mouth daily 90 tablet 1    potassium chloride (Klor-Con) 10 mEq tablet Take 1 tablet (10 mEq total) by mouth in the morning 90 tablet 1     No current facility-administered medications for this visit.     Current Outpatient Medications on File Prior to Visit   Medication Sig    amLODIPine (NORVASC) 2.5 mg tablet Take 2.5 mg by mouth daily    aspirin (ECOTRIN LOW STRENGTH) 81 mg EC tablet Take 81 mg by mouth daily    atorvastatin (Lipitor) 10 mg tablet Take 1 tablet (10 mg total) by mouth daily    hydrochlorothiazide (HYDRODIURIL) 12.5 mg tablet Take 1  "tablet (12.5 mg total) by mouth daily    levothyroxine 112 mcg tablet Take 1 tablet (112 mcg total) by mouth daily    lisinopril (ZESTRIL) 2.5 mg tablet Take 1 tablet (2.5 mg total) by mouth daily    metoprolol succinate (TOPROL-XL) 25 mg 24 hr tablet Take 1 tablet (25 mg total) by mouth daily    potassium chloride (Klor-Con) 10 mEq tablet Take 1 tablet (10 mEq total) by mouth in the morning     No current facility-administered medications on file prior to visit.     She is allergic to penicillins..    Review of Systems   Constitutional:  Negative for chills and fever.   HENT:  Negative for congestion, ear pain and sore throat.    Eyes:  Negative for pain.   Respiratory:  Negative for cough and shortness of breath.    Cardiovascular:  Negative for chest pain and leg swelling.   Gastrointestinal:  Negative for abdominal pain, nausea and vomiting.   Endocrine: Negative for polyuria.   Genitourinary:  Negative for difficulty urinating, frequency and urgency.   Musculoskeletal:  Negative for arthralgias and back pain.   Skin:  Negative for rash.   Neurological:  Negative for weakness and headaches.   Psychiatric/Behavioral:  Negative for sleep disturbance. The patient is not nervous/anxious.          Objective:      /82 (BP Location: Left arm, Patient Position: Sitting, Cuff Size: Adult)   Pulse 91   Temp (!) 97.4 °F (36.3 °C) (Temporal)   Ht 5' 3\" (1.6 m)   Wt 66.7 kg (147 lb)   SpO2 97%   BMI 26.04 kg/m²     Recent Results (from the past 1344 hour(s))   Lipid Panel with Direct LDL reflex    Collection Time: 07/31/24 11:29 AM   Result Value Ref Range    Total Cholesterol 153 <200 mg/dL    HDL 43 (L) > OR = 50 mg/dL    Triglycerides 93 <150 mg/dL    LDL Calculated 91 mg/dL (calc)    Chol HDLC Ratio 3.6 <5.0 (calc)    Non-HDL Cholesterol 110 <130 mg/dL (calc)   Comprehensive metabolic panel    Collection Time: 07/31/24 11:29 AM   Result Value Ref Range    Glucose, Random 128 (H) 65 - 99 mg/dL    BUN 15 7 - 25 " mg/dL    Creatinine 0.71 0.50 - 1.05 mg/dL    eGFR 95 > OR = 60 mL/min/1.73m2    SL AMB BUN/CREATININE RATIO SEE NOTE: 6 - 22 (calc)    Sodium 139 135 - 146 mmol/L    Potassium 4.0 3.5 - 5.3 mmol/L    Chloride 101 98 - 110 mmol/L    CO2 27 20 - 32 mmol/L    Calcium 10.1 8.6 - 10.4 mg/dL    Protein, Total 7.3 6.1 - 8.1 g/dL    Albumin 4.6 3.6 - 5.1 g/dL    Globulin 2.7 1.9 - 3.7 g/dL (calc)    Albumin/Globulin Ratio 1.7 1.0 - 2.5 (calc)    TOTAL BILIRUBIN 0.5 0.2 - 1.2 mg/dL    Alkaline Phosphatase 74 37 - 153 U/L    AST 15 10 - 35 U/L    ALT 10 6 - 29 U/L   CBC and differential    Collection Time: 07/31/24 11:29 AM   Result Value Ref Range    White Blood Cell Count 5.8 3.8 - 10.8 Thousand/uL    Red Blood Cell Count 4.04 3.80 - 5.10 Million/uL    Hemoglobin 12.5 11.7 - 15.5 g/dL    HCT 36.3 35.0 - 45.0 %    MCV 89.9 80.0 - 100.0 fL    MCH 30.9 27.0 - 33.0 pg    MCHC 34.4 32.0 - 36.0 g/dL    RDW 12.4 11.0 - 15.0 %    Platelet Count 295 140 - 400 Thousand/uL    SL AMB MPV 10.4 7.5 - 12.5 fL    Neutrophils (Absolute) 2,598 1,500 - 7,800 cells/uL    Lymphocytes (Absolute) 2,459 850 - 3,900 cells/uL    Monocytes (Absolute) 545 200 - 950 cells/uL    Eosinophils (Absolute) 139 15 - 500 cells/uL    Basophils ABS 58 0 - 200 cells/uL    Neutrophils 44.8 %    Lymphocytes 42.4 %    Monocytes 9.4 %    Eosinophils 2.4 %    Basophils PCT 1.0 %   TSH, 3rd generation    Collection Time: 07/31/24 11:29 AM   Result Value Ref Range    TSH 1.39 0.40 - 4.50 mIU/L   Hemoglobin A1c (w/out EAG)    Collection Time: 07/31/24 11:29 AM   Result Value Ref Range    Hemoglobin A1C 6.7 (H) <5.7 % of total Hgb        Physical Exam  Constitutional:       Appearance: Normal appearance.   HENT:      Head: Normocephalic.      Right Ear: External ear normal.      Left Ear: External ear normal.      Nose: Nose normal. No congestion.      Mouth/Throat:      Mouth: Mucous membranes are moist.      Pharynx: Oropharynx is clear. No oropharyngeal exudate or  posterior oropharyngeal erythema.   Eyes:      Extraocular Movements: Extraocular movements intact.      Conjunctiva/sclera: Conjunctivae normal.   Cardiovascular:      Rate and Rhythm: Normal rate and regular rhythm.      Heart sounds: Normal heart sounds. No murmur heard.  Pulmonary:      Effort: Pulmonary effort is normal.      Breath sounds: Normal breath sounds. No wheezing or rales.   Abdominal:      General: Abdomen is flat. There is no distension.      Palpations: Abdomen is soft.      Tenderness: There is no abdominal tenderness.   Musculoskeletal:         General: Normal range of motion.      Cervical back: Normal range of motion and neck supple.      Right lower leg: No edema.      Left lower leg: No edema.   Lymphadenopathy:      Cervical: No cervical adenopathy.   Skin:     General: Skin is warm.   Neurological:      General: No focal deficit present.      Mental Status: She is alert and oriented to person, place, and time.

## 2024-08-04 ENCOUNTER — PATIENT MESSAGE (OUTPATIENT)
Dept: INTERNAL MEDICINE CLINIC | Facility: CLINIC | Age: 64
End: 2024-08-04

## 2024-08-04 DIAGNOSIS — E11.9 TYPE 2 DIABETES, HBA1C GOAL < 7% (HCC): Primary | ICD-10-CM

## 2024-08-05 ENCOUNTER — TELEPHONE (OUTPATIENT)
Age: 64
End: 2024-08-05

## 2024-08-05 DIAGNOSIS — E11.9 TYPE 2 DIABETES, HBA1C GOAL < 7% (HCC): Primary | ICD-10-CM

## 2024-08-05 NOTE — TELEPHONE ENCOUNTER
Pharmacist called inquiring on getting an order for One Touch Lancets and Test Strips sent to  [facility] #[id number] - [place], [place].    Pharmacy did confirm receiving the Blood Glucose Monitoring Kit.

## 2024-08-05 NOTE — TELEPHONE ENCOUNTER
Pt was ordered a Blood Glucose Monitoring Kit but also needs the order for the lancets and strips sent to [email or web address] #[id number] - [place], [place] - [address] . Pt would appreciate a call back when ordered [phone] thank you.

## 2024-08-06 ENCOUNTER — TELEPHONE (OUTPATIENT)
Age: 64
End: 2024-08-06

## 2024-08-06 DIAGNOSIS — E08.21 DIABETES MELLITUS DUE TO UNDERLYING CONDITION WITH DIABETIC NEPHROPATHY, WITHOUT LONG-TERM CURRENT USE OF INSULIN (HCC): Primary | ICD-10-CM

## 2024-08-06 NOTE — TELEPHONE ENCOUNTER
Patients pharmacy will need a script sent over for the one touch glucometer, one touch testing strips, and one touch lancets.   Please specify how many times a day the patient is to test blood sugar when sending all three scripts.   Please follow up with patient after orders have been placed

## 2024-08-12 DIAGNOSIS — E08.21 DIABETES MELLITUS DUE TO UNDERLYING CONDITION WITH DIABETIC NEPHROPATHY, WITHOUT LONG-TERM CURRENT USE OF INSULIN (HCC): Primary | ICD-10-CM

## 2024-08-12 RX ORDER — LANCETS 30 GAUGE
EACH MISCELLANEOUS
Qty: 120 EACH | Refills: 3 | Status: SHIPPED | OUTPATIENT
Start: 2024-08-12

## 2024-08-12 NOTE — TELEPHONE ENCOUNTER
Please be advised, patient called this morning to notify us that she was still without testing strips and lancets for her diabetic supplies. As the provider had previously asked for this to be completed and orders were in the system, they just needed to be corrected for medicare billing showing qty/sig/dx - we updated and sent to pharmacy - spoke with pharmacy and confirmed confirmation of accuracy, patient was updated and will be able to  her supplies today.

## 2024-08-12 NOTE — TELEPHONE ENCOUNTER
Patient called this morning to give update that her testing strips and lancets had not been corrected and sent over to [facility] as requested, per providers last notes it was documented that this be updated and sent.

## 2024-09-25 ENCOUNTER — PATIENT MESSAGE (OUTPATIENT)
Dept: INTERNAL MEDICINE CLINIC | Facility: CLINIC | Age: 64
End: 2024-09-25

## 2024-09-25 PROBLEM — E11.9 TYPE 2 DIABETES, HBA1C GOAL < 7% (HCC): Status: ACTIVE | Noted: 2024-09-25

## 2024-09-26 DIAGNOSIS — E11.9 TYPE 2 DIABETES, HBA1C GOAL < 7% (HCC): Primary | ICD-10-CM

## 2024-10-29 ENCOUNTER — VBI (OUTPATIENT)
Dept: ADMINISTRATIVE | Facility: OTHER | Age: 64
End: 2024-10-29

## 2024-10-29 NOTE — TELEPHONE ENCOUNTER
10/29/24 10:15 AM     Chart reviewed for Mammogram ; nothing is submitted to the patient's insurance at this time.     Amanda Mack MA   [facility]

## 2024-12-11 ENCOUNTER — VBI (OUTPATIENT)
Dept: ADMINISTRATIVE | Facility: OTHER | Age: 64
End: 2024-12-11

## 2024-12-11 NOTE — TELEPHONE ENCOUNTER
12/11/24 8:28 AM     Chart reviewed for CRC: Colonoscopy ; nothing is submitted to the patient's insurance at this time.     Amanda Mack MA   [facility]

## 2025-01-17 DIAGNOSIS — E03.9 ACQUIRED HYPOTHYROIDISM: ICD-10-CM

## 2025-01-17 RX ORDER — LEVOTHYROXINE SODIUM 112 UG/1
112 TABLET ORAL DAILY
Qty: 90 TABLET | Refills: 1 | Status: SHIPPED | OUTPATIENT
Start: 2025-01-17

## 2025-02-01 ENCOUNTER — OFFICE VISIT (OUTPATIENT)
Dept: URGENT CARE | Age: 65
End: 2025-02-01
Payer: COMMERCIAL

## 2025-02-01 VITALS
OXYGEN SATURATION: 98 % | RESPIRATION RATE: 18 BRPM | SYSTOLIC BLOOD PRESSURE: 140 MMHG | TEMPERATURE: 96.7 F | HEART RATE: 98 BPM | DIASTOLIC BLOOD PRESSURE: 88 MMHG

## 2025-02-01 DIAGNOSIS — H66.91 RIGHT OTITIS MEDIA, UNSPECIFIED OTITIS MEDIA TYPE: Primary | ICD-10-CM

## 2025-02-01 DIAGNOSIS — J20.9 ACUTE BRONCHITIS, UNSPECIFIED ORGANISM: ICD-10-CM

## 2025-02-01 PROCEDURE — 99214 OFFICE O/P EST MOD 30 MIN: CPT

## 2025-02-01 RX ORDER — AZITHROMYCIN 250 MG/1
TABLET, FILM COATED ORAL
Qty: 6 TABLET | Refills: 0 | Status: SHIPPED | OUTPATIENT
Start: 2025-02-01 | End: 2025-02-05

## 2025-02-01 RX ORDER — PREDNISONE 10 MG/1
TABLET ORAL
Qty: 21 TABLET | Refills: 0 | Status: SHIPPED | OUTPATIENT
Start: 2025-02-01

## 2025-02-01 NOTE — PROGRESS NOTES
[facility]        NAME: Nara Espino is a 64 y.o. female  : 1960    MRN: [id number]  DATE: 2025  TIME: 2:10 PM    Assessment and Plan   Right otitis media, unspecified otitis media type [H66.91]  1. Right otitis media, unspecified otitis media type  azithromycin (ZITHROMAX) 250 mg tablet      2. Acute bronchitis, unspecified organism  predniSONE 10 mg tablet        Nara presented with a right ear infection.   Wait 2-3 days to take amoxicillin as prescribed (1g POq8h x 5-7 in adults; 1gPO peds max. Treat under 2 years x 10d. Treat 2+ 7d)    May start Prednisone taper. The best time to take a steroid is in the morning with breakfast. Do not take additional Motrin/Ibuproven/Advil while on the Prednisone.  You may take additional Tylenol as needed for pain/fever.   We will begin treatment with an oral antibiotic.  Take antibiotics as prescribed.   Take entire course of antibiotics.     Eat yogurt with live and active cultures and/or take a probiotic at least 3 hours before or after antibiotic dose.   Monitor stool for diarrhea and/or blood. If this occurs, contact primary care doctor ASAP.     Offer fluids and small amounts of fluids frequently to help with hydration.  Note that ear discomfort from fluid may persist for up to one month  Rest  Tylenol/Ibuprofen for discomfort      Patient Instructions       Follow up with PCP in 3-5 days.  Proceed to  ER if symptoms worsen.    If tests have been performed at [facility], our office will contact you with results if changes need to be made to the care plan discussed with you at the visit.  You can review your full results on [facility] MyChart.    Chief Complaint     Chief Complaint   Patient presents with   • Cough   • Cold Like Symptoms   • Earache     Patient having a productive cough that started 1 week ago.          History of Present Illness       64-year-old female presenting with 4 days of right ear pain into the right side of her  neck and jaw, and coarse cough.  She denies fever, chills, shortness of breath or difficulty breathing.  She has taken Tylenol for symptoms without relief.  She reports she has had previous episodes of bronchitis, however has not experienced ear pain with previous symptoms.  Nuys drainage from the ear, changes in hearing, or headache.    Cough  Associated symptoms include ear pain. Pertinent negatives include no chest pain, chills, fever, shortness of breath or wheezing.   Earache   Associated symptoms include coughing.       Review of Systems   Review of Systems   Constitutional:  Negative for chills, fatigue and fever.   HENT:  Positive for ear pain. Negative for dental problem.    Respiratory:  Positive for cough. Negative for chest tightness, shortness of breath and wheezing.    Cardiovascular:  Negative for chest pain.         Current Medications       Current Outpatient Medications:   •  amLODIPine (NORVASC) 2.5 mg tablet, Take 2.5 mg by mouth daily, Disp: , Rfl:   •  aspirin (ECOTRIN LOW STRENGTH) 81 mg EC tablet, Take 81 mg by mouth daily, Disp: , Rfl:   •  atorvastatin (Lipitor) 10 mg tablet, Take 1 tablet (10 mg total) by mouth daily, Disp: 90 tablet, Rfl: 1  •  azithromycin (ZITHROMAX) 250 mg tablet, Take 2 tablets today then 1 tablet daily x 4 days, Disp: 6 tablet, Rfl: 0  •  Blood Glucose Monitoring Suppl (D-Care Glucometer) w/Device KIT, Use in the morning, Disp: 1 kit, Rfl: 0  •  glucose blood test strip, Use to check blood sugar four times a day, Disp: 120 each, Rfl: 3  •  hydrochlorothiazide (HYDRODIURIL) 12.5 mg tablet, Take 1 tablet (12.5 mg total) by mouth daily, Disp: 90 tablet, Rfl: 1  •  levothyroxine 112 mcg tablet, Take 1 tablet (112 mcg total) by mouth daily, Disp: 90 tablet, Rfl: 1  •  lisinopril (ZESTRIL) 2.5 mg tablet, Take 1 tablet (2.5 mg total) by mouth daily, Disp: 90 tablet, Rfl: 1  •  metoprolol succinate (TOPROL-XL) 25 mg 24 hr tablet, Take 1 tablet (25 mg total) by mouth daily,  Disp: 90 tablet, Rfl: 1  •  OneTouch Delica Lancets 30G MISC, Use to check blood sugar four times a day, Disp: 120 each, Rfl: 3  •  potassium chloride (Klor-Con) 10 mEq tablet, Take 1 tablet (10 mEq total) by mouth in the morning, Disp: 90 tablet, Rfl: 1  •  predniSONE 10 mg tablet, 6 Tabs day 1, 5 tabs day 2, 4 tabs day 3, 3 tabs day 4, 2 tabs day 5, 1 tab day 6, Disp: 21 tablet, Rfl: 0    Current Allergies     Allergies as of 02/01/2025 - Reviewed 02/01/2025   Allergen Reaction Noted   • Penicillins Hives and Itching 05/28/2016            The following portions of the patient's history were reviewed and updated as appropriate: allergies, current medications, past family history, past medical history, past social history, past surgical history and problem list.     Past Medical History:   Diagnosis Date   • Heart disease    • High cholesterol    • Irregular heart beat    • PAD (peripheral artery disease) (HCC)    • Primary hypertension        Past Surgical History:   Procedure Laterality Date   • LAPAROTOMY      FOR FALLOPIAN TUBE       Family History   Problem Relation Age of Onset   • Heart disease Mother    • Graves' disease Mother    • Stroke Mother    • Lung cancer Mother    • Stroke Father    • Heart disease Father          Medications have been verified.        Objective   /88   Pulse 98   Temp (!) 96.7 °F (35.9 °C)   Resp 18   SpO2 98%   No LMP recorded.       Physical Exam     Physical Exam  Vitals and nursing note reviewed.   Constitutional:       General: She is not in acute distress.     Appearance: Normal appearance. She is normal weight. She is not ill-appearing.   HENT:      Right Ear: Hearing normal. Tympanic membrane is injected, erythematous and bulging.      Left Ear: Hearing and tympanic membrane normal.      Nose: Rhinorrhea present. No congestion.   Eyes:      Extraocular Movements: Extraocular movements intact.   Cardiovascular:      Rate and Rhythm: Normal rate and regular rhythm.       Pulses: Normal pulses.   Pulmonary:      Effort: Pulmonary effort is normal. No respiratory distress.      Breath sounds: No stridor. Rhonchi (cleared with cough) present. No wheezing or rales.   Chest:      Chest wall: No tenderness.   Abdominal:      Palpations: Abdomen is soft.   Skin:     Capillary Refill: Capillary refill takes less than 2 seconds.   Neurological:      General: No focal deficit present.      Mental Status: She is alert and oriented to person, place, and time.

## 2025-02-01 NOTE — PATIENT INSTRUCTIONS
May start Prednisone taper. The best time to take a steroid is in the morning with breakfast. Do not take additional Motrin/Ibuproven/Advil while on the Prednisone.  You may take additional Tylenol as needed for pain/fever.     We will begin treatment with an oral antibiotic.  Take antibiotics as prescribed.   Take entire course of antibiotics.     Eat yogurt with live and active cultures and/or take a probiotic at least 3 hours before or after antibiotic dose.   Monitor stool for diarrhea and/or blood. If this occurs, contact primary care doctor ASAP.     Offer fluids and small amounts of fluids frequently to help with hydration.  Note that ear discomfort from fluid may persist for up to one month  Rest  Tylenol/Ibuprofen for discomfort

## 2025-02-04 ENCOUNTER — DOCUMENTATION (OUTPATIENT)
Dept: ADMINISTRATIVE | Facility: OTHER | Age: 65
End: 2025-02-04

## 2025-02-04 NOTE — PROGRESS NOTES
Aysha Smith, RN  P Patient Reported Team         Blood pressure elevated  Appointment department: [facility]  Appointment provider: * No providers found *  Blood pressure  02/01/25 1337 140/88  02/01/25 1335 140/88    02/04/25 3:44 PM    Patient was called after the Urgent Care visit ; there was no answer/ a message could not be left.    Thank you.  Michelet Maher MA  [facility]

## 2025-02-20 LAB
ALBUMIN SERPL-MCNC: 4.8 G/DL (ref 3.6–5.1)
ALBUMIN/CREAT UR: 26 MG/G CREAT
ALBUMIN/GLOB SERPL: 1.8 (CALC) (ref 1–2.5)
ALP SERPL-CCNC: 82 U/L (ref 37–153)
ALT SERPL-CCNC: 9 U/L (ref 6–29)
AST SERPL-CCNC: 15 U/L (ref 10–35)
BASOPHILS # BLD AUTO: 69 CELLS/UL (ref 0–200)
BASOPHILS NFR BLD AUTO: 0.9 %
BILIRUB SERPL-MCNC: 0.5 MG/DL (ref 0.2–1.2)
BUN SERPL-MCNC: 22 MG/DL (ref 7–25)
BUN/CREAT SERPL: NORMAL (CALC) (ref 6–22)
CALCIUM SERPL-MCNC: 10.1 MG/DL (ref 8.6–10.4)
CHLORIDE SERPL-SCNC: 102 MMOL/L (ref 98–110)
CHOLEST SERPL-MCNC: 180 MG/DL
CHOLEST/HDLC SERPL: 3.4 (CALC)
CO2 SERPL-SCNC: 27 MMOL/L (ref 20–32)
CREAT SERPL-MCNC: 0.77 MG/DL (ref 0.5–1.05)
CREAT UR-MCNC: 117 MG/DL (ref 20–275)
EOSINOPHIL # BLD AUTO: 69 CELLS/UL (ref 15–500)
EOSINOPHIL NFR BLD AUTO: 0.9 %
ERYTHROCYTE [DISTWIDTH] IN BLOOD BY AUTOMATED COUNT: 12.8 % (ref 11–15)
GFR/BSA.PRED SERPLBLD CYS-BASED-ARV: 87 ML/MIN/1.73M2
GLOBULIN SER CALC-MCNC: 2.7 G/DL (CALC) (ref 1.9–3.7)
GLUCOSE SERPL-MCNC: 99 MG/DL (ref 65–99)
HBA1C MFR BLD: 6.4 % OF TOTAL HGB
HCT VFR BLD AUTO: 41.2 % (ref 35–45)
HDLC SERPL-MCNC: 53 MG/DL
HGB BLD-MCNC: 13.6 G/DL (ref 11.7–15.5)
LDLC SERPL CALC-MCNC: 106 MG/DL (CALC)
LYMPHOCYTES # BLD AUTO: 3673 CELLS/UL (ref 850–3900)
LYMPHOCYTES NFR BLD AUTO: 47.7 %
MCH RBC QN AUTO: 31.5 PG (ref 27–33)
MCHC RBC AUTO-ENTMCNC: 33 G/DL (ref 32–36)
MCV RBC AUTO: 95.4 FL (ref 80–100)
MICROALBUMIN UR-MCNC: 3 MG/DL
MONOCYTES # BLD AUTO: 531 CELLS/UL (ref 200–950)
MONOCYTES NFR BLD AUTO: 6.9 %
NEUTROPHILS # BLD AUTO: 3357 CELLS/UL (ref 1500–7800)
NEUTROPHILS NFR BLD AUTO: 43.6 %
NONHDLC SERPL-MCNC: 127 MG/DL (CALC)
PLATELET # BLD AUTO: 386 THOUSAND/UL (ref 140–400)
PMV BLD REES-ECKER: 10.6 FL (ref 7.5–12.5)
POTASSIUM SERPL-SCNC: 4.7 MMOL/L (ref 3.5–5.3)
PROT SERPL-MCNC: 7.5 G/DL (ref 6.1–8.1)
RBC # BLD AUTO: 4.32 MILLION/UL (ref 3.8–5.1)
SODIUM SERPL-SCNC: 140 MMOL/L (ref 135–146)
TRIGL SERPL-MCNC: 117 MG/DL
TSH SERPL-ACNC: 8.53 MIU/L (ref 0.4–4.5)
WBC # BLD AUTO: 7.7 THOUSAND/UL (ref 3.8–10.8)

## 2025-02-23 DIAGNOSIS — E08.21 DIABETES MELLITUS DUE TO UNDERLYING CONDITION WITH DIABETIC NEPHROPATHY, WITHOUT LONG-TERM CURRENT USE OF INSULIN (HCC): ICD-10-CM

## 2025-02-24 RX ORDER — LANCETS
EACH MISCELLANEOUS 4 TIMES DAILY
Qty: 102 EACH | Refills: 1 | Status: SHIPPED | OUTPATIENT
Start: 2025-02-24

## 2025-02-26 ENCOUNTER — OFFICE VISIT (OUTPATIENT)
Dept: INTERNAL MEDICINE CLINIC | Facility: CLINIC | Age: 65
End: 2025-02-26
Payer: COMMERCIAL

## 2025-02-26 VITALS
HEIGHT: 63 IN | BODY MASS INDEX: 26.29 KG/M2 | OXYGEN SATURATION: 93 % | DIASTOLIC BLOOD PRESSURE: 88 MMHG | WEIGHT: 148.4 LBS | SYSTOLIC BLOOD PRESSURE: 122 MMHG | HEART RATE: 86 BPM

## 2025-02-26 DIAGNOSIS — Z12.12 SCREENING FOR COLORECTAL CANCER: ICD-10-CM

## 2025-02-26 DIAGNOSIS — E03.9 ACQUIRED HYPOTHYROIDISM: ICD-10-CM

## 2025-02-26 DIAGNOSIS — E78.2 MIXED HYPERLIPIDEMIA: ICD-10-CM

## 2025-02-26 DIAGNOSIS — E11.9 TYPE 2 DIABETES, HBA1C GOAL < 7% (HCC): Primary | ICD-10-CM

## 2025-02-26 DIAGNOSIS — M47.816 SPONDYLOSIS OF LUMBAR REGION WITHOUT MYELOPATHY OR RADICULOPATHY: ICD-10-CM

## 2025-02-26 DIAGNOSIS — Z01.00 DIABETIC EYE EXAM (HCC): ICD-10-CM

## 2025-02-26 DIAGNOSIS — E87.6 HYPOKALEMIA: ICD-10-CM

## 2025-02-26 DIAGNOSIS — E11.9 DIABETIC EYE EXAM (HCC): ICD-10-CM

## 2025-02-26 DIAGNOSIS — I10 ESSENTIAL HYPERTENSION, BENIGN: ICD-10-CM

## 2025-02-26 DIAGNOSIS — E08.21 DIABETES MELLITUS DUE TO UNDERLYING CONDITION WITH DIABETIC NEPHROPATHY, WITHOUT LONG-TERM CURRENT USE OF INSULIN (HCC): ICD-10-CM

## 2025-02-26 DIAGNOSIS — Z12.11 SCREENING FOR COLORECTAL CANCER: ICD-10-CM

## 2025-02-26 DIAGNOSIS — Z00.00 MEDICARE ANNUAL WELLNESS VISIT, SUBSEQUENT: ICD-10-CM

## 2025-02-26 PROCEDURE — 99214 OFFICE O/P EST MOD 30 MIN: CPT | Performed by: INTERNAL MEDICINE

## 2025-02-26 PROCEDURE — G0439 PPPS, SUBSEQ VISIT: HCPCS | Performed by: INTERNAL MEDICINE

## 2025-02-26 NOTE — PROGRESS NOTES
Name: Nara Espino      : 1960      MRN: [id number]  Encounter Provider: Martinez Moore MD  Encounter Date: 2025   Encounter department: [facility]    Assessment & Plan  Type 2 diabetes, HbA1c goal < 7% (Formerly [facility])    Lab Results   Component Value Date    HGBA1C 6.4 (H) 2025            Diabetic eye exam (Formerly [facility])    Orders:    Ambulatory Referral to Ophthalmology; Future    Screening for colorectal cancer    Orders:    Ambulatory Referral to Gastroenterology; Future    Essential hypertension, benign         Acquired hypothyroidism         Mixed hyperlipidemia         Spondylosis of lumbar region without myelopathy or radiculopathy         Hypokalemia         Medicare annual wellness visit, subsequent         Diabetes mellitus due to underlying condition with diabetic nephropathy, without long-term current use of insulin (Formerly [facility])    Lab Results   Component Value Date    HGBA1C 6.4 (H) 2025              Depression Screening and Follow-up Plan: Patient was screened for depression during today's encounter. They screened negative with a PHQ-9 score of 1.      Tobacco Cessation Counseling: Tobacco cessation counseling was provided. The patient is sincerely urged to quit consumption of tobacco. She is ready to quit tobacco.       Preventive health issues were discussed with patient, and age appropriate screening tests were ordered as noted in patient's After Visit Summary. Personalized health advice and appropriate referrals for health education or preventive services given if needed, as noted in patient's After Visit Summary.    History of Present Illness     Follow-up on blood test done on 2025 test discussed with her, also medical wellness exam       Patient Care Team:  Martinez Moore MD as PCP - General (Internal Medicine)  Martinez Moore MD as PCP - PCP-[facility] ([facility])    Review of Systems   Constitutional:  Negative for chills and fever.   HENT:  Negative for  congestion, ear pain and sore throat.    Eyes:  Negative for pain.   Respiratory:  Negative for cough and shortness of breath.    Cardiovascular:  Negative for chest pain and leg swelling.   Gastrointestinal:  Negative for abdominal pain, nausea and vomiting.   Endocrine: Negative for polyuria.   Genitourinary:  Negative for difficulty urinating, frequency and urgency.   Musculoskeletal:  Negative for arthralgias and back pain.   Skin:  Negative for rash.   Neurological:  Negative for weakness and headaches.   Psychiatric/Behavioral:  Negative for sleep disturbance. The patient is not nervous/anxious.      Medical History Reviewed by provider this encounter:       Annual Wellness Visit Questionnaire   Nara is here for her Subsequent Wellness visit.     Health Risk Assessment:   Patient rates overall health as good. Patient feels that their physical health rating is slightly better. Patient is satisfied with their life. Eyesight was rated as same. Hearing was rated as same. Patient feels that their emotional and mental health rating is slightly worse. Patients states they are sometimes angry. Patient states they are often unusually tired/fatigued. Patient states that she has experienced no weight loss or gain in last 6 months.     Depression Screening:   PHQ-9 Score: 1      Fall Risk Screening:   In the past year, patient has experienced: no history of falling in past year      Urinary Incontinence Screening:   Patient has not leaked urine accidently in the last six months.     Home Safety:  Patient does not have trouble with stairs inside or outside of their home. Patient has working smoke alarms and has no working carbon monoxide detector. Home safety hazards include: none.     Nutrition:   Current diet is Regular.     Medications:   Patient is not currently taking any over-the-counter supplements. Patient is able to manage medications.     Activities of Daily Living (ADLs)/Instrumental Activities of Daily  Living (IADLs):   Walk and transfer into and out of bed and chair?: Yes  Dress and groom yourself?: Yes    Bathe or shower yourself?: Yes    Feed yourself? Yes  Do your laundry/housekeeping?: Yes  Manage your money, pay your bills and track your expenses?: Yes  Make your own meals?: Yes    Do your own shopping?: Yes    Advance Care Planning:   Living will: No    Durable POA for healthcare: No    Advanced directive: No    ACP document given: Yes      Cognitive Screening:   Provider or family/friend/caregiver concerned regarding cognition?: No    PREVENTIVE SCREENINGS      Cardiovascular Screening:    General: Screening Not Indicated and History Lipid Disorder      Diabetes Screening:     General: Screening Not Indicated and History Diabetes      Lung Cancer Screening:     General: Screening Not Indicated      Hepatitis C Screening:    General: Screening Current    Screening, Brief Intervention, and Referral to Treatment (SBIRT)     Screening  Typical number of drinks in a day: 0  Typical number of drinks in a week: 0  Interpretation: Low risk drinking behavior.    AUDIT-C Screenin) How often did you have a drink containing alcohol in the past year? never  2) How many drinks did you have on a typical day when you were drinking in the past year? 0  3) How often did you have 6 or more drinks on one occasion in the past year? never    AUDIT-C Score: 0  Interpretation: Score 0-2 (female): Negative screen for alcohol misuse    Single Item Drug Screening:  How often have you used an illegal drug (including marijuana) or a prescription medication for non-medical reasons in the past year? never    Single Item Drug Screen Score: 0  Interpretation: Negative screen for possible drug use disorder    Social Drivers of Health     Financial Resource Strain: Low Risk  (2023)    Overall Financial Resource Strain (CARDIA)     Difficulty of Paying Living Expenses: Not hard at all   Food Insecurity: No Food Insecurity  "(2/26/2025)    Hunger Vital Sign     Worried About Running Out of Food in the Last Year: Never true     Ran Out of Food in the Last Year: Never true   Transportation Needs: No Transportation Needs (2/26/2025)    PRAPARE - Transportation     Lack of Transportation (Medical): No     Lack of Transportation (Non-Medical): No   Housing Stability: Unknown (2/26/2025)    Housing Stability Vital Sign     Unable to Pay for Housing in the Last Year: No     Homeless in the Last Year: No   Utilities: Not At Risk (2/26/2025)    [facility] Utilities     Threatened with loss of utilities: No     No results found.    Objective   /88 (Patient Position: Sitting, Cuff Size: Adult)   Pulse 86   Ht 5' 3\" (1.6 m)   Wt 67.3 kg (148 lb 6.4 oz)   SpO2 93%   BMI 26.29 kg/m²     Physical Exam  Vitals and nursing note reviewed.   Constitutional:       General: She is not in acute distress.     Appearance: She is well-developed.   HENT:      Head: Normocephalic and atraumatic.      Right Ear: Tympanic membrane, ear canal and external ear normal.      Left Ear: Tympanic membrane, ear canal and external ear normal.      Mouth/Throat:      Mouth: Mucous membranes are moist.      Pharynx: Oropharynx is clear.   Eyes:      Extraocular Movements: Extraocular movements intact.      Conjunctiva/sclera: Conjunctivae normal.   Cardiovascular:      Rate and Rhythm: Normal rate and regular rhythm.      Heart sounds: Normal heart sounds. No murmur heard.  Pulmonary:      Effort: Pulmonary effort is normal. No respiratory distress.      Breath sounds: Normal breath sounds. No wheezing or rales.   Abdominal:      General: Abdomen is flat. There is no distension.      Palpations: Abdomen is soft.      Tenderness: There is no abdominal tenderness.   Musculoskeletal:         General: No swelling.      Cervical back: Neck supple.      Right lower leg: No edema.      Left lower leg: No edema.   Skin:     General: Skin is warm and dry.      Capillary Refill: " Capillary refill takes less than 2 seconds.   Neurological:      General: No focal deficit present.      Mental Status: She is alert and oriented to person, place, and time.   Psychiatric:         Mood and Affect: Mood normal.

## 2025-04-15 ENCOUNTER — OFFICE VISIT (OUTPATIENT)
Dept: OBGYN CLINIC | Facility: CLINIC | Age: 65
End: 2025-04-15
Payer: COMMERCIAL

## 2025-04-15 VITALS
DIASTOLIC BLOOD PRESSURE: 80 MMHG | BODY MASS INDEX: 26.22 KG/M2 | SYSTOLIC BLOOD PRESSURE: 142 MMHG | WEIGHT: 148 LBS | HEIGHT: 63 IN

## 2025-04-15 DIAGNOSIS — Z12.4 ENCOUNTER FOR SCREENING FOR MALIGNANT NEOPLASM OF CERVIX: ICD-10-CM

## 2025-04-15 DIAGNOSIS — Z01.419 WELL WOMAN EXAM WITH ROUTINE GYNECOLOGICAL EXAM: Primary | ICD-10-CM

## 2025-04-15 DIAGNOSIS — Z11.51 SCREENING FOR HPV (HUMAN PAPILLOMAVIRUS): ICD-10-CM

## 2025-04-15 DIAGNOSIS — Z12.31 ENCOUNTER FOR SCREENING MAMMOGRAM FOR MALIGNANT NEOPLASM OF BREAST: ICD-10-CM

## 2025-04-15 PROCEDURE — G0476 HPV COMBO ASSAY CA SCREEN: HCPCS

## 2025-04-15 PROCEDURE — G0145 SCR C/V CYTO,THINLAYER,RESCR: HCPCS

## 2025-04-15 PROCEDURE — G0101 CA SCREEN;PELVIC/BREAST EXAM: HCPCS

## 2025-04-15 NOTE — PROGRESS NOTES
ASSESSMENT & PLAN:   Diagnoses and all orders for this visit:    Well woman exam with routine gynecological exam  -     Liquid-based pap, screening  -     Mammo screening bilateral w 3d and cad; Future    Screening for HPV (human papillomavirus)  -     Liquid-based pap, screening    Encounter for screening for malignant neoplasm of cervix  -     Liquid-based pap, screening    Encounter for screening mammogram for malignant neoplasm of breast  -     Mammo screening bilateral w 3d and cad; Future      The following were reviewed in today's visit: ASCCP guidelines, Gardisil vaccination, STD testing breast self exam, STD testing, exercise, and healthy diet.    Patient to return to office in yearly for annual exam.     All questions have been answered to her satisfaction.    CC:  Annual Gynecologic Examination  Chief Complaint   Patient presents with    Gynecologic Exam     Pap 10/17/22 wnl, hpv-   Mammo 22     HPI: Nara Espino is a 64 y.o.  who presents for annual gynecologic examination.  She is a new patient to our office, previously saw [facility]. She has the following concerns:  none.       Health Maintenance:    Exercise: intermittently  Breast exams/breast awareness: no  Last mammogram: 2022  Colorectal cancer screening: N/A    Past Medical History:   Diagnosis Date    Heart disease     High cholesterol     Irregular heart beat     PAD (peripheral artery disease) (HCC)     Primary hypertension      Past Surgical History:   Procedure Laterality Date    LAPAROTOMY      FOR FALLOPIAN TUBE     Past OB/Gyn History:   No LMP recorded. Patient is postmenopausal.    Menopausal status: postmenopausal  Menopausal symptoms: None    Last Pap: 10/2022 : no abnormalities  History of abnormal Pap smear: no    Patient is not currently sexually active.   STD testing: no  Current contraception: post menopausal status    Family History  Family History   Problem Relation Age of Onset    Heart disease Mother     Graves'  disease Mother     Stroke Mother     Lung cancer Mother     Stroke Father     Heart disease Father      Family history of uterine or ovarian cancer: no  Family history of breast cancer: no  Family history of colon cancer: no    Social History:  Social History     Socioeconomic History    Marital status: /Civil Union     Spouse name: Not on file    Number of children: Not on file    Years of education: Not on file    Highest education level: Not on file   Occupational History    Not on file   Tobacco Use    Smoking status: Every Day     Current packs/day: 0.50     Types: Cigarettes     Passive exposure: Never    Smokeless tobacco: Never   Vaping Use    Vaping status: Never Used   Substance and Sexual Activity    Alcohol use: Not Currently    Drug use: Not Currently    Sexual activity: Not Currently     Partners: Male     Birth control/protection: Post-menopausal   Other Topics Concern    Not on file   Social History Narrative    Not on file     Social Drivers of Health     Financial Resource Strain: Low Risk  (5/16/2023)    Overall Financial Resource Strain (CARDIA)     Difficulty of Paying Living Expenses: Not hard at all   Food Insecurity: No Food Insecurity (2/26/2025)    Hunger Vital Sign     Worried About Running Out of Food in the Last Year: Never true     Ran Out of Food in the Last Year: Never true   Transportation Needs: No Transportation Needs (2/26/2025)    PRAPARE - Transportation     Lack of Transportation (Medical): No     Lack of Transportation (Non-Medical): No   Physical Activity: Not on file   Stress: Not on file   Social Connections: Not on file   Intimate Partner Violence: Not on file   Housing Stability: Low Risk  (2/26/2025)    Housing Stability Vital Sign     Unable to Pay for Housing in the Last Year: No     Number of Times Moved in the Last Year: 1     Homeless in the Last Year: No     Domestic violence screen: negative    Allergies:  Allergies   Allergen Reactions    Penicillins Hives  and Itching       Medications:    Current Outpatient Medications:     Accu-Chek FastClix Lancets MISC, TEST FOUR TIMES DAILY, Disp: 102 each, Rfl: 1    amLODIPine (NORVASC) 2.5 mg tablet, Take 2.5 mg by mouth daily, Disp: , Rfl:     aspirin (ECOTRIN LOW STRENGTH) 81 mg EC tablet, Take 81 mg by mouth daily, Disp: , Rfl:     atorvastatin (Lipitor) 10 mg tablet, Take 1 tablet (10 mg total) by mouth daily, Disp: 90 tablet, Rfl: 1    Blood Glucose Monitoring Suppl (D-Care Glucometer) w/Device KIT, Use in the morning, Disp: 1 kit, Rfl: 0    glucose blood test strip, Use to check blood sugar four times a day, Disp: 120 each, Rfl: 3    hydrochlorothiazide (HYDRODIURIL) 12.5 mg tablet, Take 1 tablet (12.5 mg total) by mouth daily, Disp: 90 tablet, Rfl: 1    levothyroxine 112 mcg tablet, Take 1 tablet (112 mcg total) by mouth daily, Disp: 90 tablet, Rfl: 1    lisinopril (ZESTRIL) 2.5 mg tablet, Take 1 tablet (2.5 mg total) by mouth daily, Disp: 90 tablet, Rfl: 1    metoprolol succinate (TOPROL-XL) 25 mg 24 hr tablet, Take 1 tablet (25 mg total) by mouth daily, Disp: 90 tablet, Rfl: 1    potassium chloride (Klor-Con) 10 mEq tablet, Take 1 tablet (10 mEq total) by mouth in the morning, Disp: 90 tablet, Rfl: 1    predniSONE 10 mg tablet, 6 Tabs day 1, 5 tabs day 2, 4 tabs day 3, 3 tabs day 4, 2 tabs day 5, 1 tab day 6 (Patient not taking: Reported on 2/26/2025), Disp: 21 tablet, Rfl: 0    Review of Systems:  Review of Systems   Constitutional:  Negative for chills and fever.   Respiratory:  Negative for shortness of breath.    Cardiovascular:  Negative for chest pain.   Gastrointestinal:  Negative for abdominal pain, constipation and diarrhea.   Genitourinary:  Positive for dyspareunia. Negative for dysuria, frequency, pelvic pain, vaginal discharge and vaginal pain.   Psychiatric/Behavioral:  Negative for self-injury and suicidal ideas.      Physical Exam:  /80 (BP Location: Right arm, Patient Position: Sitting, Cuff  "Size: Standard)   Ht 5' 3\" (1.6 m)   Wt 67.1 kg (148 lb)   BMI 26.22 kg/m²    Physical Exam  Constitutional:       Appearance: Normal appearance.   Genitourinary:      Vulva and urethral meatus normal.      No labial fusion noted.      No vaginal erythema or tenderness.        Right Adnexa: not tender.     Left Adnexa: not tender.     No cervical discharge or friability.      Uterus is not tender.   Breasts:     Breasts are symmetrical.      Right: Normal.      Left: Normal.   HENT:      Head: Normocephalic and atraumatic.   Neck:      Thyroid: No thyroid mass or thyroid tenderness.   Cardiovascular:      Rate and Rhythm: Normal rate and regular rhythm.      Heart sounds: Normal heart sounds. No murmur heard.  Pulmonary:      Effort: Pulmonary effort is normal.      Breath sounds: Normal breath sounds. No wheezing.   Abdominal:      Palpations: Abdomen is soft. There is no mass.      Tenderness: There is no abdominal tenderness.   Lymphadenopathy:      Cervical: No cervical adenopathy.   Neurological:      General: No focal deficit present.      Mental Status: She is alert and oriented to person, place, and time.   Skin:     General: Skin is warm and dry.   Psychiatric:         Mood and Affect: Mood normal.         Behavior: Behavior normal.   Vitals and nursing note reviewed.                              "

## 2025-04-16 LAB
HPV HR 12 DNA CVX QL NAA+PROBE: NEGATIVE
HPV16 DNA CVX QL NAA+PROBE: NEGATIVE
HPV18 DNA CVX QL NAA+PROBE: NEGATIVE

## 2025-04-19 LAB
LAB AP GYN PRIMARY INTERPRETATION: NORMAL
Lab: NORMAL

## 2025-04-21 ENCOUNTER — RESULTS FOLLOW-UP (OUTPATIENT)
Dept: OBGYN CLINIC | Facility: CLINIC | Age: 65
End: 2025-04-21

## 2025-07-20 DIAGNOSIS — E03.9 ACQUIRED HYPOTHYROIDISM: ICD-10-CM

## 2025-07-21 RX ORDER — LEVOTHYROXINE SODIUM 112 UG/1
112 TABLET ORAL DAILY
Qty: 90 TABLET | Refills: 1 | Status: SHIPPED | OUTPATIENT
Start: 2025-07-21